# Patient Record
Sex: FEMALE | Race: WHITE | NOT HISPANIC OR LATINO
[De-identification: names, ages, dates, MRNs, and addresses within clinical notes are randomized per-mention and may not be internally consistent; named-entity substitution may affect disease eponyms.]

---

## 2017-01-04 ENCOUNTER — APPOINTMENT (OUTPATIENT)
Dept: SURGERY | Facility: CLINIC | Age: 72
End: 2017-01-04

## 2017-01-04 DIAGNOSIS — I10 ESSENTIAL (PRIMARY) HYPERTENSION: ICD-10-CM

## 2017-01-04 DIAGNOSIS — Z87.19 PERSONAL HISTORY OF OTHER DISEASES OF THE DIGESTIVE SYSTEM: ICD-10-CM

## 2017-02-10 ENCOUNTER — APPOINTMENT (OUTPATIENT)
Dept: PULMONOLOGY | Facility: CLINIC | Age: 72
End: 2017-02-10

## 2017-11-22 ENCOUNTER — APPOINTMENT (OUTPATIENT)
Dept: SURGERY | Facility: CLINIC | Age: 72
End: 2017-11-22

## 2018-08-01 ENCOUNTER — APPOINTMENT (OUTPATIENT)
Dept: SURGERY | Facility: CLINIC | Age: 73
End: 2018-08-01

## 2019-04-11 ENCOUNTER — INPATIENT (INPATIENT)
Facility: HOSPITAL | Age: 74
LOS: 4 days | Discharge: NURSING, REHAB OR LONG-TERM CARE | End: 2019-04-16
Attending: INTERNAL MEDICINE | Admitting: INTERNAL MEDICINE
Payer: MEDICARE

## 2019-04-11 VITALS
HEART RATE: 100 BPM | SYSTOLIC BLOOD PRESSURE: 171 MMHG | TEMPERATURE: 99 F | RESPIRATION RATE: 16 BRPM | DIASTOLIC BLOOD PRESSURE: 95 MMHG | OXYGEN SATURATION: 100 %

## 2019-04-11 DIAGNOSIS — J45.909 UNSPECIFIED ASTHMA, UNCOMPLICATED: ICD-10-CM

## 2019-04-11 DIAGNOSIS — M79.604 PAIN IN RIGHT LEG: ICD-10-CM

## 2019-04-11 DIAGNOSIS — M25.551 PAIN IN RIGHT HIP: ICD-10-CM

## 2019-04-11 DIAGNOSIS — I10 ESSENTIAL (PRIMARY) HYPERTENSION: ICD-10-CM

## 2019-04-11 DIAGNOSIS — R31.9 HEMATURIA, UNSPECIFIED: ICD-10-CM

## 2019-04-11 LAB
ALBUMIN SERPL ELPH-MCNC: 3.9 G/DL — SIGNIFICANT CHANGE UP (ref 3.3–5)
ALP SERPL-CCNC: 70 U/L — SIGNIFICANT CHANGE UP (ref 40–120)
ALT FLD-CCNC: 15 U/L — SIGNIFICANT CHANGE UP (ref 4–33)
ANION GAP SERPL CALC-SCNC: 14 MMO/L — SIGNIFICANT CHANGE UP (ref 7–14)
APPEARANCE UR: CLEAR — SIGNIFICANT CHANGE UP
AST SERPL-CCNC: 33 U/L — HIGH (ref 4–32)
BACTERIA # UR AUTO: NEGATIVE — SIGNIFICANT CHANGE UP
BASOPHILS # BLD AUTO: 0.05 K/UL — SIGNIFICANT CHANGE UP (ref 0–0.2)
BASOPHILS NFR BLD AUTO: 0.8 % — SIGNIFICANT CHANGE UP (ref 0–2)
BILIRUB SERPL-MCNC: 1 MG/DL — SIGNIFICANT CHANGE UP (ref 0.2–1.2)
BILIRUB UR-MCNC: NEGATIVE — SIGNIFICANT CHANGE UP
BLOOD UR QL VISUAL: NEGATIVE — SIGNIFICANT CHANGE UP
BUN SERPL-MCNC: 11 MG/DL — SIGNIFICANT CHANGE UP (ref 7–23)
CALCIUM SERPL-MCNC: 9.8 MG/DL — SIGNIFICANT CHANGE UP (ref 8.4–10.5)
CHLORIDE SERPL-SCNC: 103 MMOL/L — SIGNIFICANT CHANGE UP (ref 98–107)
CK SERPL-CCNC: 160 U/L — SIGNIFICANT CHANGE UP (ref 25–170)
CO2 SERPL-SCNC: 24 MMOL/L — SIGNIFICANT CHANGE UP (ref 22–31)
COLOR SPEC: YELLOW — SIGNIFICANT CHANGE UP
CREAT SERPL-MCNC: 0.72 MG/DL — SIGNIFICANT CHANGE UP (ref 0.5–1.3)
EOSINOPHIL # BLD AUTO: 0.08 K/UL — SIGNIFICANT CHANGE UP (ref 0–0.5)
EOSINOPHIL NFR BLD AUTO: 1.2 % — SIGNIFICANT CHANGE UP (ref 0–6)
GLUCOSE SERPL-MCNC: 114 MG/DL — HIGH (ref 70–99)
GLUCOSE UR-MCNC: NEGATIVE — SIGNIFICANT CHANGE UP
HCT VFR BLD CALC: 44.4 % — SIGNIFICANT CHANGE UP (ref 34.5–45)
HGB BLD-MCNC: 14.6 G/DL — SIGNIFICANT CHANGE UP (ref 11.5–15.5)
HYALINE CASTS # UR AUTO: NEGATIVE — SIGNIFICANT CHANGE UP
IMM GRANULOCYTES NFR BLD AUTO: 0.3 % — SIGNIFICANT CHANGE UP (ref 0–1.5)
KETONES UR-MCNC: NEGATIVE — SIGNIFICANT CHANGE UP
LEUKOCYTE ESTERASE UR-ACNC: SIGNIFICANT CHANGE UP
LYMPHOCYTES # BLD AUTO: 0.78 K/UL — LOW (ref 1–3.3)
LYMPHOCYTES # BLD AUTO: 12 % — LOW (ref 13–44)
MCHC RBC-ENTMCNC: 31.5 PG — SIGNIFICANT CHANGE UP (ref 27–34)
MCHC RBC-ENTMCNC: 32.9 % — SIGNIFICANT CHANGE UP (ref 32–36)
MCV RBC AUTO: 95.7 FL — SIGNIFICANT CHANGE UP (ref 80–100)
MONOCYTES # BLD AUTO: 0.41 K/UL — SIGNIFICANT CHANGE UP (ref 0–0.9)
MONOCYTES NFR BLD AUTO: 6.3 % — SIGNIFICANT CHANGE UP (ref 2–14)
NEUTROPHILS # BLD AUTO: 5.15 K/UL — SIGNIFICANT CHANGE UP (ref 1.8–7.4)
NEUTROPHILS NFR BLD AUTO: 79.4 % — HIGH (ref 43–77)
NITRITE UR-MCNC: NEGATIVE — SIGNIFICANT CHANGE UP
NRBC # FLD: 0 K/UL — SIGNIFICANT CHANGE UP (ref 0–0)
PH UR: 6.5 — SIGNIFICANT CHANGE UP (ref 5–8)
PLATELET # BLD AUTO: 158 K/UL — SIGNIFICANT CHANGE UP (ref 150–400)
PMV BLD: 9.5 FL — SIGNIFICANT CHANGE UP (ref 7–13)
POTASSIUM SERPL-MCNC: 4.7 MMOL/L — SIGNIFICANT CHANGE UP (ref 3.5–5.3)
POTASSIUM SERPL-SCNC: 4.7 MMOL/L — SIGNIFICANT CHANGE UP (ref 3.5–5.3)
PROT SERPL-MCNC: 7.5 G/DL — SIGNIFICANT CHANGE UP (ref 6–8.3)
PROT UR-MCNC: 10 — SIGNIFICANT CHANGE UP
RBC # BLD: 4.64 M/UL — SIGNIFICANT CHANGE UP (ref 3.8–5.2)
RBC # FLD: 13.1 % — SIGNIFICANT CHANGE UP (ref 10.3–14.5)
RBC CASTS # UR COMP ASSIST: SIGNIFICANT CHANGE UP (ref 0–?)
SODIUM SERPL-SCNC: 141 MMOL/L — SIGNIFICANT CHANGE UP (ref 135–145)
SP GR SPEC: 1.02 — SIGNIFICANT CHANGE UP (ref 1–1.04)
SQUAMOUS # UR AUTO: SIGNIFICANT CHANGE UP
UROBILINOGEN FLD QL: NORMAL — SIGNIFICANT CHANGE UP
WBC # BLD: 6.49 K/UL — SIGNIFICANT CHANGE UP (ref 3.8–10.5)
WBC # FLD AUTO: 6.49 K/UL — SIGNIFICANT CHANGE UP (ref 3.8–10.5)
WBC UR QL: HIGH (ref 0–?)

## 2019-04-11 PROCEDURE — 73502 X-RAY EXAM HIP UNI 2-3 VIEWS: CPT | Mod: 26,RT

## 2019-04-11 PROCEDURE — 74176 CT ABD & PELVIS W/O CONTRAST: CPT | Mod: 26,GC

## 2019-04-11 PROCEDURE — 93971 EXTREMITY STUDY: CPT | Mod: 26,LT

## 2019-04-11 PROCEDURE — 99223 1ST HOSP IP/OBS HIGH 75: CPT

## 2019-04-11 RX ORDER — IRBESARTAN 75 MG/1
1 TABLET ORAL
Qty: 0 | Refills: 0 | COMMUNITY

## 2019-04-11 RX ORDER — CYCLOBENZAPRINE HYDROCHLORIDE 10 MG/1
10 TABLET, FILM COATED ORAL THREE TIMES A DAY
Qty: 0 | Refills: 0 | Status: DISCONTINUED | OUTPATIENT
Start: 2019-04-11 | End: 2019-04-14

## 2019-04-11 RX ORDER — LOSARTAN POTASSIUM 100 MG/1
50 TABLET, FILM COATED ORAL DAILY
Qty: 0 | Refills: 0 | Status: DISCONTINUED | OUTPATIENT
Start: 2019-04-11 | End: 2019-04-16

## 2019-04-11 RX ORDER — FLUTICASONE PROPIONATE AND SALMETEROL 50; 250 UG/1; UG/1
1 POWDER ORAL; RESPIRATORY (INHALATION)
Qty: 0 | Refills: 0 | COMMUNITY

## 2019-04-11 RX ORDER — PANTOPRAZOLE SODIUM 20 MG/1
40 TABLET, DELAYED RELEASE ORAL
Qty: 0 | Refills: 0 | Status: DISCONTINUED | OUTPATIENT
Start: 2019-04-11 | End: 2019-04-16

## 2019-04-11 RX ORDER — OXYCODONE AND ACETAMINOPHEN 5; 325 MG/1; MG/1
1 TABLET ORAL ONCE
Qty: 0 | Refills: 0 | Status: DISCONTINUED | OUTPATIENT
Start: 2019-04-11 | End: 2019-04-11

## 2019-04-11 RX ORDER — CHOLECALCIFEROL (VITAMIN D3) 125 MCG
1000 CAPSULE ORAL DAILY
Qty: 0 | Refills: 0 | Status: DISCONTINUED | OUTPATIENT
Start: 2019-04-11 | End: 2019-04-16

## 2019-04-11 RX ORDER — SODIUM CHLORIDE 9 MG/ML
1000 INJECTION INTRAMUSCULAR; INTRAVENOUS; SUBCUTANEOUS
Qty: 0 | Refills: 0 | Status: DISCONTINUED | OUTPATIENT
Start: 2019-04-11 | End: 2019-04-13

## 2019-04-11 RX ORDER — CYCLOBENZAPRINE HYDROCHLORIDE 10 MG/1
10 TABLET, FILM COATED ORAL ONCE
Qty: 0 | Refills: 0 | Status: COMPLETED | OUTPATIENT
Start: 2019-04-11 | End: 2019-04-11

## 2019-04-11 RX ORDER — KETOROLAC TROMETHAMINE 30 MG/ML
15 SYRINGE (ML) INJECTION EVERY 6 HOURS
Qty: 0 | Refills: 0 | Status: DISCONTINUED | OUTPATIENT
Start: 2019-04-11 | End: 2019-04-12

## 2019-04-11 RX ORDER — DILTIAZEM HCL 120 MG
1 CAPSULE, EXT RELEASE 24 HR ORAL
Qty: 0 | Refills: 0 | COMMUNITY

## 2019-04-11 RX ORDER — ZOLPIDEM TARTRATE 10 MG/1
5 TABLET ORAL AT BEDTIME
Qty: 0 | Refills: 0 | Status: DISCONTINUED | OUTPATIENT
Start: 2019-04-11 | End: 2019-04-15

## 2019-04-11 RX ORDER — DEXAMETHASONE 0.5 MG/5ML
6 ELIXIR ORAL ONCE
Qty: 0 | Refills: 0 | Status: COMPLETED | OUTPATIENT
Start: 2019-04-11 | End: 2019-04-11

## 2019-04-11 RX ORDER — TRIAMTERENE/HYDROCHLOROTHIAZID 75 MG-50MG
1 TABLET ORAL
Qty: 0 | Refills: 0 | COMMUNITY

## 2019-04-11 RX ORDER — CHOLECALCIFEROL (VITAMIN D3) 125 MCG
1 CAPSULE ORAL
Qty: 0 | Refills: 0 | COMMUNITY

## 2019-04-11 RX ORDER — KETOROLAC TROMETHAMINE 30 MG/ML
15 SYRINGE (ML) INJECTION EVERY 6 HOURS
Qty: 0 | Refills: 0 | Status: DISCONTINUED | OUTPATIENT
Start: 2019-04-11 | End: 2019-04-11

## 2019-04-11 RX ORDER — ENOXAPARIN SODIUM 100 MG/ML
40 INJECTION SUBCUTANEOUS EVERY 24 HOURS
Qty: 0 | Refills: 0 | Status: DISCONTINUED | OUTPATIENT
Start: 2019-04-11 | End: 2019-04-16

## 2019-04-11 RX ORDER — ANASTROZOLE 1 MG/1
1 TABLET ORAL AT BEDTIME
Qty: 0 | Refills: 0 | Status: DISCONTINUED | OUTPATIENT
Start: 2019-04-12 | End: 2019-04-16

## 2019-04-11 RX ORDER — MORPHINE SULFATE 50 MG/1
2 CAPSULE, EXTENDED RELEASE ORAL ONCE
Qty: 0 | Refills: 0 | Status: DISCONTINUED | OUTPATIENT
Start: 2019-04-11 | End: 2019-04-11

## 2019-04-11 RX ORDER — BUDESONIDE AND FORMOTEROL FUMARATE DIHYDRATE 160; 4.5 UG/1; UG/1
2 AEROSOL RESPIRATORY (INHALATION)
Qty: 0 | Refills: 0 | Status: DISCONTINUED | OUTPATIENT
Start: 2019-04-11 | End: 2019-04-16

## 2019-04-11 RX ORDER — DILTIAZEM HCL 120 MG
180 CAPSULE, EXT RELEASE 24 HR ORAL DAILY
Qty: 0 | Refills: 0 | Status: DISCONTINUED | OUTPATIENT
Start: 2019-04-11 | End: 2019-04-16

## 2019-04-11 RX ORDER — ACETAMINOPHEN 500 MG
650 TABLET ORAL ONCE
Qty: 0 | Refills: 0 | Status: COMPLETED | OUTPATIENT
Start: 2019-04-11 | End: 2019-04-11

## 2019-04-11 RX ORDER — ANASTROZOLE 1 MG/1
1 TABLET ORAL ONCE
Qty: 0 | Refills: 0 | Status: COMPLETED | OUTPATIENT
Start: 2019-04-11 | End: 2019-04-11

## 2019-04-11 RX ORDER — ANASTROZOLE 1 MG/1
1 TABLET ORAL
Qty: 0 | Refills: 0 | COMMUNITY

## 2019-04-11 RX ORDER — KETOROLAC TROMETHAMINE 30 MG/ML
15 SYRINGE (ML) INJECTION ONCE
Qty: 0 | Refills: 0 | Status: DISCONTINUED | OUTPATIENT
Start: 2019-04-11 | End: 2019-04-11

## 2019-04-11 RX ORDER — HYDROMORPHONE HYDROCHLORIDE 2 MG/ML
0.5 INJECTION INTRAMUSCULAR; INTRAVENOUS; SUBCUTANEOUS ONCE
Qty: 0 | Refills: 0 | Status: DISCONTINUED | OUTPATIENT
Start: 2019-04-11 | End: 2019-04-11

## 2019-04-11 RX ORDER — TRIAMTERENE/HYDROCHLOROTHIAZID 75 MG-50MG
1 TABLET ORAL
Qty: 0 | Refills: 0 | Status: DISCONTINUED | OUTPATIENT
Start: 2019-04-11 | End: 2019-04-16

## 2019-04-11 RX ADMIN — Medication 650 MILLIGRAM(S): at 15:19

## 2019-04-11 RX ADMIN — ANASTROZOLE 1 MILLIGRAM(S): 1 TABLET ORAL at 23:38

## 2019-04-11 RX ADMIN — OXYCODONE AND ACETAMINOPHEN 1 TABLET(S): 5; 325 TABLET ORAL at 16:30

## 2019-04-11 RX ADMIN — HYDROMORPHONE HYDROCHLORIDE 0.5 MILLIGRAM(S): 2 INJECTION INTRAMUSCULAR; INTRAVENOUS; SUBCUTANEOUS at 17:02

## 2019-04-11 RX ADMIN — Medication 650 MILLIGRAM(S): at 16:30

## 2019-04-11 RX ADMIN — MORPHINE SULFATE 2 MILLIGRAM(S): 50 CAPSULE, EXTENDED RELEASE ORAL at 10:01

## 2019-04-11 RX ADMIN — Medication 6 MILLIGRAM(S): at 17:02

## 2019-04-11 RX ADMIN — HYDROMORPHONE HYDROCHLORIDE 0.5 MILLIGRAM(S): 2 INJECTION INTRAMUSCULAR; INTRAVENOUS; SUBCUTANEOUS at 19:56

## 2019-04-11 RX ADMIN — CYCLOBENZAPRINE HYDROCHLORIDE 10 MILLIGRAM(S): 10 TABLET, FILM COATED ORAL at 15:19

## 2019-04-11 RX ADMIN — LOSARTAN POTASSIUM 50 MILLIGRAM(S): 100 TABLET, FILM COATED ORAL at 19:56

## 2019-04-11 RX ADMIN — Medication 15 MILLIGRAM(S): at 23:39

## 2019-04-11 RX ADMIN — SODIUM CHLORIDE 100 MILLILITER(S): 9 INJECTION INTRAMUSCULAR; INTRAVENOUS; SUBCUTANEOUS at 23:49

## 2019-04-11 RX ADMIN — Medication 15 MILLIGRAM(S): at 16:30

## 2019-04-11 RX ADMIN — MORPHINE SULFATE 2 MILLIGRAM(S): 50 CAPSULE, EXTENDED RELEASE ORAL at 15:13

## 2019-04-11 RX ADMIN — OXYCODONE AND ACETAMINOPHEN 1 TABLET(S): 5; 325 TABLET ORAL at 15:19

## 2019-04-11 RX ADMIN — Medication 15 MILLIGRAM(S): at 15:19

## 2019-04-11 NOTE — ED ADULT NURSE REASSESSMENT NOTE - NS ED NURSE REASSESS COMMENT FT1
covering primary RN for break pt is in bed  A and OX  3 in NAD pt c/o lower back pain and right leg pain. orders noted and completed, comfort measures provided.

## 2019-04-11 NOTE — ED ADULT NURSE NOTE - OBJECTIVE STATEMENT
Patient received to room 6, A&Ox4, respirations even and unlabored, skin warm and dry good for color, skin intact, right foot +1 pitting edema observed, +pedal pulses, + capillary refill, ambulatory with cane at baseline, currently moves all extremities, patient reports unable to walk x 2 weeks due to lower back and right leg pain. Patient arrives with complaints of lower back pain radiating towards right leg, hematuria. Denies chest pain, SOB, LOC. Hx right breast CA, HTN, Asthma. Left Ac 20g IC placed, labs drawn and sent.

## 2019-04-11 NOTE — ED PROVIDER NOTE - CARE PLAN
Principal Discharge DX:	Pain in right leg  Secondary Diagnosis:	Hematuria Principal Discharge DX:	Pain in right leg  Secondary Diagnosis:	Hematuria  Secondary Diagnosis:	Hip pain  Secondary Diagnosis:	Inability to ambulate due to hip

## 2019-04-11 NOTE — H&P ADULT - HISTORY OF PRESENT ILLNESS
72 yo f h/o right breast ca s/p partial mastectomy on anastrozole 74 yo f h/o right breast ca s/p partial mastectomy on anastrozole, htn, well-controlled asthma. Pt reports progressively worsening right hip/anteri 72 yo f h/o right breast ca s/p partial mastectomy on anastrozole, htn, well-controlled asthma. Pt reports progressively worsening right hip/anterior thigh pain over last weeks that began after one severe cough. Denies direct trauma, fall; denies numbness, focal weakness, fevers. Reports chronic unchanged urinary incontinence. Does note one episode of mild hematuria this morning, mild right flank pain on palpation, denies dysuria.  UA pending. Plain hip film negative for fracture or dislocation. Ct renal stone hunt negative for stones or hydro, + mild left lateral subluxation of L4-5.  Pt  since December, lives alone; children  live within an hour away, cannot ambulate due to pain. 74 yo f h/o right breast ca s/p partial mastectomy on anastrozole, htn, well-controlled asthma. Pt reports progressively worsening right hip/anterior thigh pain over last weeks that began after one severe cough. Denies direct trauma, fall; denies numbness, focal weakness, fevers. Reports chronic unchanged urinary incontinence. Does note one episode of mild hematuria this morning, mild right flank pain on palpation, denies dysuria.  UA pending. Plain hip film negative for fracture or dislocation. Ct renal stone hunt negative for stones or hydro, + mild left lateral subluxation of L4-5.  Pt  since December, lives alone; children  live within an hour away, cannot ambulate due to pain.  Pt reports taking 2 azithromycin 3 days ago for cough, but stopped when developed diarrhea. Pt with no cough during my exam, lungs CTA

## 2019-04-11 NOTE — ED ADULT NURSE REASSESSMENT NOTE - NS ED NURSE REASSESS COMMENT FT1
[facility] MAR[facility] Langford.

## 2019-04-11 NOTE — ED PROVIDER NOTE - CLINICAL SUMMARY MEDICAL DECISION MAKING FREE TEXT BOX
72 Y/o F notes 1 day of hematuria an 5 days of leg and thigh pain worse over the past 1-2 days. No upper lumbar/thoracic back pain. Will US to R/O R dvt due to swelling and pain. Will non-con due to hematuria and flank pain, no CVA tenderness on exam. Pt is well appearing. Labs and UA ordered.

## 2019-04-11 NOTE — ED ADULT NURSE NOTE - NSIMPLEMENTINTERV_GEN_ALL_ED
Implemented All Fall Risk Interventions:  [place] to call system. Call bell, personal items and telephone within reach. Instruct patient to call for assistance. Room bathroom lighting operational. Non-slip footwear when patient is off stretcher. Physically safe environment: no spills, clutter or unnecessary equipment. Stretcher in lowest position, wheels locked, appropriate side rails in place. Provide visual cue, wrist band, yellow gown, etc. Monitor gait and stability. Monitor for mental status changes and reorient to person, place, and time. Review medications for side effects contributing to fall risk. Reinforce activity limits and safety measures with patient and family.

## 2019-04-11 NOTE — H&P ADULT - NSHPREVIEWOFSYSTEMS_GEN_ALL_CORE
Review of Systems:   CONSTITUTIONAL: No fever, weight loss  EYES: No eye pain, visual disturbances, or discharge  ENMT:  No difficulty hearing, tinnitus, vertigo; No sinus or throat pain  NECK: No pain or stiffness  RESPIRATORY: No cough, wheezing, chills or hemoptysis; No shortness of breath  CARDIOVASCULAR: No chest pain, palpitations, dizziness, or leg swelling  GASTROINTESTINAL: No abdominal or epigastric pain. No nausea, vomiting, or hematemesis; No diarrhea or constipation. No melena or hematochezia.  GENITOURINARY: No dysuria, frequency, hematuria, or incontinence  NEUROLOGICAL: No headaches, memory loss, loss of strength, numbness, or tremors  SKIN: No itching, burning, rashes, or lesions   MUSCULOSKELETAL:  right hip/anterior thigh pain Review of Systems:   CONSTITUTIONAL: No fever, weight loss  EYES: No eye pain, visual disturbances, or discharge  ENMT:  No difficulty hearing, tinnitus, vertigo; No sinus or throat pain  NECK: No pain or stiffness  RESPIRATORY: No cough, wheezing, chills or hemoptysis; No shortness of breath  CARDIOVASCULAR: No chest pain, palpitations, dizziness, or leg swelling  GASTROINTESTINAL: No abdominal or epigastric pain. No nausea, vomiting, or hematemesis;  GENITOURINARY: No dysuria, frequency; +hematuria x1, chronic incontinence  NEUROLOGICAL: No headaches, memory loss, loss of strength, numbness, or tremors  SKIN: No itching, burning, rashes, or lesions   MUSCULOSKELETAL:  right hip/anterior thigh pain

## 2019-04-11 NOTE — H&P ADULT - NSICDXPASTMEDICALHX_GEN_ALL_CORE_FT
PAST MEDICAL HISTORY:  Acid reflux     Asthma controlled    Hypertension     Obese     Osteoarthritis bilateral knee    Seasonal allergies

## 2019-04-11 NOTE — ED PROVIDER NOTE - OBJECTIVE STATEMENT
74 Y/O F PMH Arthritis HTN Minimal breast CA C/O 1 week of R leg and flank pain. Pt states her R leg is always somewhat swollen. He 72 Y/O F PMH Arthritis HTN Minimal breast CA C/O 1 week of R leg and flank pain. Pt states her R leg is always somewhat swollen. She states she had a normal US one year ago in the leg. States the pain begins in her leg and goes up towards the back. She also C/O 1 day of hematuria. She states the pain is 9/10 and sharp. She had one episode of diarrhea yesterday, states she recently finished  Denies CP SOB ABD PN N V D Dizz or any other acute symptoms.

## 2019-04-11 NOTE — H&P ADULT - PROBLEM SELECTOR PLAN 1
2/2 hip OA? referred pain from lumbar spine?  will place on standing Toradol for next 24 hours, as well as prn flexiril  would obtain PM&R eval  Pt ordered

## 2019-04-11 NOTE — ED PROVIDER NOTE - ATTENDING CONTRIBUTION TO CARE
The patient seen and examined.  The patient presents with back pain and right leg pain  Most likely musculoskeletal  Sciatica    I, Romel Mcmillan, performed the initial face to face bedside interview with this patient regarding history of present illness, review of symptoms and relevant past medical, social and family history.  I completed an independent physical examination.  I was the initial provider who evaluated this patient. I have signed out the follow up of any pending tests (i.e. labs, radiological studies) to the ACP.  I have communicated the patient’s plan of care and disposition with the ACP.

## 2019-04-11 NOTE — ED PROVIDER NOTE - PHYSICAL EXAMINATION
R leg swelling, mild R calf tenderness, palpable dorsal pulse cap refill <2 seconds. Mild pain with movement of hip however pain localizes to leg. No flank tenderness.

## 2019-04-11 NOTE — ED PROVIDER NOTE - PROGRESS NOTE DETAILS
NIHARIKA Davis: Pt still unable to ambulate in spite of multiple rounds of pain medication in ER. Admit for PT eval, inability to ambulate, unsafe discharge.

## 2019-04-11 NOTE — H&P ADULT - NSHPLABSRESULTS_GEN_ALL_CORE
14.6   6.49  )-----------( 158      ( 2019 09:40 )             44.4     04-11    141  |  103  |  11  ----------------------------<  114<H>  4.7   |  24  |  0.72    Ca    9.8      2019 09:40    TPro  7.5  /  Alb  3.9  /  TBili  1.0  /  DBili  x   /  AST  33<H>  /  ALT  15  /  AlkPhos  70  04-11    CAPILLARY BLOOD GLUCOSE          Urinalysis Basic - ( 2019 22:10 )    Color: YELLOW / Appearance: CLEAR / S.017 / pH: 6.5  Gluc: NEGATIVE / Ketone: NEGATIVE  / Bili: NEGATIVE / Urobili: NORMAL   Blood: NEGATIVE / Protein: 10 / Nitrite: NEGATIVE   Leuk Esterase: MODERATE / RBC: 0-2 / WBC 6-10   Sq Epi: MODERATE / Non Sq Epi: x / Bacteria: NEGATIVE      Vital Signs Last 24 Hrs  T(C): 36.7 (2019 22:15), Max: 37.2 (2019 08:54)  T(F): 98 (2019 22:15), Max: 98.9 (2019 08:54)  HR: 67 (2019 22:15) (67 - 100)  BP: 159/90 (2019 22:15) (140/70 - 182/99)  BP(mean): --  RR: 18 (2019 22:15) (16 - 18)  SpO2: 100% (2019 22:15) (94% - 100%)

## 2019-04-11 NOTE — ED PROVIDER NOTE - PMH
Acid reflux    Asthma  controlled  Hypertension    Obese    Osteoarthritis  bilateral knee  Seasonal allergies

## 2019-04-11 NOTE — ED ADULT TRIAGE NOTE - CHIEF COMPLAINT QUOTE
pt c/o right hip and right thigh pain x 1 week. denies fall/injury. pain unrelieved with tylenol. pt also c/o 1 episode of hematuria this morning. denies dysuria/fever/chills.

## 2019-04-11 NOTE — H&P ADULT - NSHPPHYSICALEXAM_GEN_ALL_CORE
PHYSICAL EXAM:      Constitutional: NAD, well-groomed, well-developed  HEENT: EOMI, Normal Hearing  Neck: No LAD, No JVD  Back: Normal spine flexure, No CVA tenderness  Respiratory: CTAB  Cardiovascular: S1 and S2, RRR, no M/G/R  Gastrointestinal: BS+, soft, NT/ND, mild right CVA tenderness with palpation   Extremities: No peripheral edema  Vascular: 2+ peripheral pulses  Neurological: A/O x 3  Psychiatric: Normal mood, normal affect  Musculoskeletal: 5/5 strength b/l upper; right  lower extremity limited by pain  Skin: No rashes

## 2019-04-12 LAB
APTT BLD: 29.9 SEC — SIGNIFICANT CHANGE UP (ref 27.5–36.3)
INR BLD: 1.15 — SIGNIFICANT CHANGE UP (ref 0.88–1.17)
PROTHROM AB SERPL-ACNC: 12.8 SEC — SIGNIFICANT CHANGE UP (ref 9.8–13.1)

## 2019-04-12 PROCEDURE — 99233 SBSQ HOSP IP/OBS HIGH 50: CPT

## 2019-04-12 PROCEDURE — 99222 1ST HOSP IP/OBS MODERATE 55: CPT | Mod: GC

## 2019-04-12 RX ORDER — DOCUSATE SODIUM 100 MG
100 CAPSULE ORAL THREE TIMES A DAY
Qty: 0 | Refills: 0 | Status: DISCONTINUED | OUTPATIENT
Start: 2019-04-12 | End: 2019-04-16

## 2019-04-12 RX ORDER — SIMETHICONE 80 MG/1
80 TABLET, CHEWABLE ORAL THREE TIMES A DAY
Qty: 0 | Refills: 0 | Status: DISCONTINUED | OUTPATIENT
Start: 2019-04-12 | End: 2019-04-16

## 2019-04-12 RX ORDER — SENNA PLUS 8.6 MG/1
2 TABLET ORAL AT BEDTIME
Qty: 0 | Refills: 0 | Status: DISCONTINUED | OUTPATIENT
Start: 2019-04-12 | End: 2019-04-16

## 2019-04-12 RX ORDER — KETOROLAC TROMETHAMINE 30 MG/ML
15 SYRINGE (ML) INJECTION EVERY 6 HOURS
Qty: 0 | Refills: 0 | Status: DISCONTINUED | OUTPATIENT
Start: 2019-04-12 | End: 2019-04-14

## 2019-04-12 RX ORDER — OXYCODONE HYDROCHLORIDE 5 MG/1
5 TABLET ORAL EVERY 4 HOURS
Qty: 0 | Refills: 0 | Status: DISCONTINUED | OUTPATIENT
Start: 2019-04-12 | End: 2019-04-14

## 2019-04-12 RX ORDER — POLYETHYLENE GLYCOL 3350 17 G/17G
17 POWDER, FOR SOLUTION ORAL DAILY
Qty: 0 | Refills: 0 | Status: DISCONTINUED | OUTPATIENT
Start: 2019-04-12 | End: 2019-04-16

## 2019-04-12 RX ORDER — HYDROMORPHONE HYDROCHLORIDE 2 MG/ML
0.5 INJECTION INTRAMUSCULAR; INTRAVENOUS; SUBCUTANEOUS EVERY 4 HOURS
Qty: 0 | Refills: 0 | Status: DISCONTINUED | OUTPATIENT
Start: 2019-04-12 | End: 2019-04-16

## 2019-04-12 RX ADMIN — LOSARTAN POTASSIUM 50 MILLIGRAM(S): 100 TABLET, FILM COATED ORAL at 06:20

## 2019-04-12 RX ADMIN — Medication 100 MILLIGRAM(S): at 12:25

## 2019-04-12 RX ADMIN — BUDESONIDE AND FORMOTEROL FUMARATE DIHYDRATE 2 PUFF(S): 160; 4.5 AEROSOL RESPIRATORY (INHALATION) at 22:14

## 2019-04-12 RX ADMIN — Medication 15 MILLIGRAM(S): at 12:25

## 2019-04-12 RX ADMIN — PANTOPRAZOLE SODIUM 40 MILLIGRAM(S): 20 TABLET, DELAYED RELEASE ORAL at 06:20

## 2019-04-12 RX ADMIN — SENNA PLUS 2 TABLET(S): 8.6 TABLET ORAL at 21:14

## 2019-04-12 RX ADMIN — SODIUM CHLORIDE 100 MILLILITER(S): 9 INJECTION INTRAMUSCULAR; INTRAVENOUS; SUBCUTANEOUS at 10:04

## 2019-04-12 RX ADMIN — SIMETHICONE 80 MILLIGRAM(S): 80 TABLET, CHEWABLE ORAL at 19:58

## 2019-04-12 RX ADMIN — Medication 15 MILLIGRAM(S): at 07:19

## 2019-04-12 RX ADMIN — ENOXAPARIN SODIUM 40 MILLIGRAM(S): 100 INJECTION SUBCUTANEOUS at 10:02

## 2019-04-12 RX ADMIN — Medication 15 MILLIGRAM(S): at 12:40

## 2019-04-12 RX ADMIN — Medication 1000 UNIT(S): at 12:25

## 2019-04-12 RX ADMIN — Medication 15 MILLIGRAM(S): at 18:27

## 2019-04-12 RX ADMIN — SIMETHICONE 80 MILLIGRAM(S): 80 TABLET, CHEWABLE ORAL at 10:01

## 2019-04-12 RX ADMIN — Medication 1 CAPSULE(S): at 10:04

## 2019-04-12 RX ADMIN — Medication 180 MILLIGRAM(S): at 06:21

## 2019-04-12 RX ADMIN — Medication 15 MILLIGRAM(S): at 18:42

## 2019-04-12 RX ADMIN — Medication 100 MILLIGRAM(S): at 10:01

## 2019-04-12 RX ADMIN — Medication 100 MILLIGRAM(S): at 21:14

## 2019-04-12 RX ADMIN — BUDESONIDE AND FORMOTEROL FUMARATE DIHYDRATE 2 PUFF(S): 160; 4.5 AEROSOL RESPIRATORY (INHALATION) at 10:01

## 2019-04-12 RX ADMIN — ANASTROZOLE 1 MILLIGRAM(S): 1 TABLET ORAL at 21:20

## 2019-04-12 RX ADMIN — Medication 15 MILLIGRAM(S): at 07:18

## 2019-04-12 RX ADMIN — Medication 15 MILLIGRAM(S): at 06:19

## 2019-04-12 NOTE — PHYSICAL THERAPY INITIAL EVALUATION ADULT - PERTINENT HX OF CURRENT PROBLEM, REHAB EVAL
Pt. is a 73 year old female admitted to [facility] due to pain in right LE. PMH: obese, HTN, Asthma.

## 2019-04-12 NOTE — CONSULT NOTE ADULT - SUBJECTIVE AND OBJECTIVE BOX
HPI:  72 yo f h/o right breast ca s/p partial mastectomy on anastrozole, htn, well-controlled asthma. Pt reports progressively worsening right hip/anterior thigh pain over last weeks that began after one severe cough. Denies direct trauma, fall; denies numbness, focal weakness, fevers. Reports chronic unchanged urinary incontinence. Does note one episode of mild hematuria this morning, mild right flank pain on palpation, denies dysuria.  UA pending. Plain hip film negative for fracture or dislocation. Ct renal stone hunt negative for stones or hydro, + mild left lateral subluxation of L4-5.  Pt  since December, lives alone; children  live within an hour away, cannot ambulate due to pain.  Pt reports taking 2 azithromycin 3 days ago for cough, but stopped when developed diarrhea. Pt with no cough during my exam, lungs CTA (2019 20:14)   CT pelvis showed LS DJD, facet arththosis and b/l hip arthritis.     REVIEW OF SYSTEMS: No chest pain, shortness of breath, nausea, vomiting or diarhea.      PAST MEDICAL & SURGICAL HISTORY  Asthma  Seasonal allergies  Osteoarthritis  Obese  Acid reflux  Hypertension  History of total knee replacement      SOCIAL HISTORY  Smoking - Denied, EtOH - Denied, Drugs - Denied    FUNCTIONAL HISTORY:   Lives alone  Independent pTA with [facility]     CURRENT FUNCTIONAL STATUS: min a         FAMILY HISTORY   No pertinent family history in first degree relatives      RECENT LABS/IMAGING  CBC Full  -  ( 2019 09:40 )  WBC Count : 6.49 K/uL  RBC Count : 4.64 M/uL  Hemoglobin : 14.6 g/dL  Hematocrit : 44.4 %  Platelet Count - Automated : 158 K/uL  Mean Cell Volume : 95.7 fL  Mean Cell Hemoglobin : 31.5 pg  Mean Cell Hemoglobin Concentration : 32.9 %  Auto Neutrophil # : 5.15 K/uL  Auto Lymphocyte # : 0.78 K/uL  Auto Monocyte # : 0.41 K/uL  Auto Eosinophil # : 0.08 K/uL  Auto Basophil # : 0.05 K/uL  Auto Neutrophil % : 79.4 %  Auto Lymphocyte % : 12.0 %  Auto Monocyte % : 6.3 %  Auto Eosinophil % : 1.2 %  Auto Basophil % : 0.8 %        141  |  103  |  11  ----------------------------<  114<H>  4.7   |  24  |  0.72    Ca    9.8      2019 09:40    TPro  7.5  /  Alb  3.9  /  TBili  1.0  /  DBili  x   /  AST  33<H>  /  ALT  15  /  AlkPhos  70  04-11    Urinalysis Basic - ( 2019 22:10 )    Color: YELLOW / Appearance: CLEAR / S.017 / pH: 6.5  Gluc: NEGATIVE / Ketone: NEGATIVE  / Bili: NEGATIVE / Urobili: NORMAL   Blood: NEGATIVE / Protein: 10 / Nitrite: NEGATIVE   Leuk Esterase: MODERATE / RBC: 0-2 / WBC 6-10   Sq Epi: MODERATE / Non Sq Epi: x / Bacteria: NEGATIVE        VITALS  T(C): 36.7 (19 @ 06:18), Max: 36.9 (19 @ 02:00)  HR: 80 (19 @ 06:18) (59 - 94)  BP: 154/90 (19 @ 06:18) (140/70 - 159/90)  RR: 16 (19 @ 06:18) (16 - 18)  SpO2: 97% (19 @ 06:18) (94% - 100%)  Wt(kg): --    ALLERGIES  No Known Allergies      MEDICATIONS   anastrozole 1 milliGRAM(s) Oral at bedtime  bisacodyl Suppository 10 milliGRAM(s) Rectal daily PRN  buDESOnide 160 MICROgram(s)/formoterol 4.5 MICROgram(s) Inhaler 2 Puff(s) Inhalation two times a day  cholecalciferol 1000 Unit(s) Oral daily  cyclobenzaprine 10 milliGRAM(s) Oral three times a day PRN  diltiazem    milliGRAM(s) Oral daily  docusate sodium 100 milliGRAM(s) Oral three times a day  enoxaparin Injectable 40 milliGRAM(s) SubCutaneous every 24 hours  losartan 50 milliGRAM(s) Oral daily  pantoprazole    Tablet 40 milliGRAM(s) Oral before breakfast  polyethylene glycol 3350 17 Gram(s) Oral daily  senna 2 Tablet(s) Oral at bedtime  simethicone 80 milliGRAM(s) Chew three times a day PRN  sodium chloride 0.9%. 1000 milliLiter(s) IV Continuous <Continuous>  triamterene 37.5 mG/hydrochlorothiazide 25 mG Capsule 1 Capsule(s) Oral <User Schedule>  zolpidem 5 milliGRAM(s) Oral at bedtime PRN      ----------------------------------------------------------------------------------------  PHYSICAL EXAM  Constitutional - NAD, Comfortable  HEENT - NCAT, EOMI  Neck - Supple, No limited ROM  Chest - CTA bilaterally, No wheeze, No rhonchi, No crackles  Cardiovascular - RRR, S1S2, No murmurs  Abdomen - BS+, Soft, NTND  Extremities - No C/C/E, No calf tenderness   Neurologic Exam -                    Cognitive - Awake, Alert, AAO to self, place, date, year, situation     Communication - Fluent, No dysarthria, no aphasia     Cranial Nerves - CN 2-12 intact     Motor - No focal deficits                       Sensory - Intact to LT     Reflexes - DTR Intact, No primitive reflexive     Balance - WNL Static  Psychiatric - Mood stable, Affect WNL HPI:  72 yo f h/o right breast ca s/p partial mastectomy on anastrozole, htn, well-controlled asthma. Pt reports progressively worsening right hip/anterior thigh pain over last weeks that began after one severe cough. Denies direct trauma, fall; denies numbness, focal weakness, fevers. Reports chronic unchanged urinary incontinence. Does note one episode of mild hematuria this morning, mild right flank pain on palpation, denies dysuria.  UA pending. Plain hip film negative for fracture or dislocation. Ct renal stone hunt negative for stones or hydro, + mild left lateral subluxation of L4-5.  Pt  since December, lives alone; children  live within an hour away, cannot ambulate due to pain.  Pt reports taking 2 azithromycin 3 days ago for cough, but stopped when developed diarrhea. Pt with no cough during my exam, lungs CTA (2019 20:14)   CT pelvis showed LS DJD, facet arththosis and b/l hip arthritis.   pain is in the right mid thigh, worse with weight bearing, better in bed.   No pain with rotation of hip. No groin pain, no numbness/tingling/weakness.     REVIEW OF SYSTEMS: No chest pain, shortness of breath, nausea, vomiting or diarhea.      PAST MEDICAL & SURGICAL HISTORY  Asthma  Seasonal allergies  Osteoarthritis  Obese  Acid reflux  Hypertension  History of total knee replacement      SOCIAL HISTORY  Smoking - Denied, EtOH - Denied, Drugs - Denied    FUNCTIONAL HISTORY:   Lives alone  Independent pTA with [facility]     CURRENT FUNCTIONAL STATUS: min a         FAMILY HISTORY   No pertinent family history in first degree relatives      RECENT LABS/IMAGING  CBC Full  -  ( 2019 09:40 )  WBC Count : 6.49 K/uL  RBC Count : 4.64 M/uL  Hemoglobin : 14.6 g/dL  Hematocrit : 44.4 %  Platelet Count - Automated : 158 K/uL  Mean Cell Volume : 95.7 fL  Mean Cell Hemoglobin : 31.5 pg  Mean Cell Hemoglobin Concentration : 32.9 %  Auto Neutrophil # : 5.15 K/uL  Auto Lymphocyte # : 0.78 K/uL  Auto Monocyte # : 0.41 K/uL  Auto Eosinophil # : 0.08 K/uL  Auto Basophil # : 0.05 K/uL  Auto Neutrophil % : 79.4 %  Auto Lymphocyte % : 12.0 %  Auto Monocyte % : 6.3 %  Auto Eosinophil % : 1.2 %  Auto Basophil % : 0.8 %        141  |  103  |  11  ----------------------------<  114<H>  4.7   |  24  |  0.72    Ca    9.8      2019 09:40    TPro  7.5  /  Alb  3.9  /  TBili  1.0  /  DBili  x   /  AST  33<H>  /  ALT  15  /  AlkPhos  70  04-11    Urinalysis Basic - ( 2019 22:10 )    Color: YELLOW / Appearance: CLEAR / S.017 / pH: 6.5  Gluc: NEGATIVE / Ketone: NEGATIVE  / Bili: NEGATIVE / Urobili: NORMAL   Blood: NEGATIVE / Protein: 10 / Nitrite: NEGATIVE   Leuk Esterase: MODERATE / RBC: 0-2 / WBC 6-10   Sq Epi: MODERATE / Non Sq Epi: x / Bacteria: NEGATIVE        VITALS  T(C): 36.7 (19 @ 06:18), Max: 36.9 (19 @ 02:00)  HR: 80 (19 @ 06:18) (59 - 94)  BP: 154/90 (19 @ 06:18) (140/70 - 159/90)  RR: 16 (19 @ 06:18) (16 - 18)  SpO2: 97% (19 @ 06:18) (94% - 100%)  Wt(kg): --    ALLERGIES  No Known Allergies      MEDICATIONS   anastrozole 1 milliGRAM(s) Oral at bedtime  bisacodyl Suppository 10 milliGRAM(s) Rectal daily PRN  buDESOnide 160 MICROgram(s)/formoterol 4.5 MICROgram(s) Inhaler 2 Puff(s) Inhalation two times a day  cholecalciferol 1000 Unit(s) Oral daily  cyclobenzaprine 10 milliGRAM(s) Oral three times a day PRN  diltiazem    milliGRAM(s) Oral daily  docusate sodium 100 milliGRAM(s) Oral three times a day  enoxaparin Injectable 40 milliGRAM(s) SubCutaneous every 24 hours  losartan 50 milliGRAM(s) Oral daily  pantoprazole    Tablet 40 milliGRAM(s) Oral before breakfast  polyethylene glycol 3350 17 Gram(s) Oral daily  senna 2 Tablet(s) Oral at bedtime  simethicone 80 milliGRAM(s) Chew three times a day PRN  sodium chloride 0.9%. 1000 milliLiter(s) IV Continuous <Continuous>  triamterene 37.5 mG/hydrochlorothiazide 25 mG Capsule 1 Capsule(s) Oral <User Schedule>  zolpidem 5 milliGRAM(s) Oral at bedtime PRN      ----------------------------------------------------------------------------------------  PHYSICAL EXAM  Constitutional - NAD, Comfortable  HEENT - NCAT, EOMI  Neck - Supple, No limited ROM  Chest - CTA bilaterally, No wheeze, No rhonchi, No crackles  Cardiovascular - RRR, S1S2, No murmurs  Abdomen - BS+, Soft, NTND  Extremities - No C/C/E, No calf tenderness + pain medial mid thigh on palpation  MSK: NROM b/l hips with no pain reproduced   Neurologic Exam -                    Cognitive - Awake, Alert, AAO to self, place, date, year, situation     Communication - Fluent, No dysarthria, no aphasia     Cranial Nerves - CN 2-12 intact     Motor - No focal deficits but RLE limited due to pain

## 2019-04-12 NOTE — PHYSICAL THERAPY INITIAL EVALUATION ADULT - MANUAL MUSCLE TESTING RESULTS, REHAB EVAL
Bilateral UE muscle strength grossly 3/5 Throughout; Bilateral LE muscle strength grossly 3/5 Throughout however, bilateral hip strength grossly 3-/5 throughout .

## 2019-04-12 NOTE — PHYSICAL THERAPY INITIAL EVALUATION ADULT - LEVEL OF INDEPENDENCE: GAIT, REHAB EVAL
in stance pt. reporting pain and requesting to return to bed. Pt. deferred ambulation assessment. MARISSA QUIROGA made aware/unable to perform

## 2019-04-12 NOTE — PROGRESS NOTE ADULT - SUBJECTIVE AND OBJECTIVE BOX
Patient is a 73y old  Female who presents with a chief complaint of right hip pain (2019 20:14)      SUBJECTIVE / OVERNIGHT EVENTS:  patient admit overnight for R thigh pain and unable to ambulate.   Patient seen and examined at bedside. still complaining of R thigh pain.   No fever, chill, SOB, CP, N/V, diarrhea, abdominal pain or dysuria.       MEDICATIONS  (STANDING):  anastrozole 1 milliGRAM(s) Oral at bedtime  buDESOnide 160 MICROgram(s)/formoterol 4.5 MICROgram(s) Inhaler 2 Puff(s) Inhalation two times a day  cholecalciferol 1000 Unit(s) Oral daily  diltiazem    milliGRAM(s) Oral daily  docusate sodium 100 milliGRAM(s) Oral three times a day  enoxaparin Injectable 40 milliGRAM(s) SubCutaneous every 24 hours  ketorolac   Injectable 15 milliGRAM(s) IV Push every 6 hours  losartan 50 milliGRAM(s) Oral daily  pantoprazole    Tablet 40 milliGRAM(s) Oral before breakfast  polyethylene glycol 3350 17 Gram(s) Oral daily  senna 2 Tablet(s) Oral at bedtime  sodium chloride 0.9%. 1000 milliLiter(s) (100 mL/Hr) IV Continuous <Continuous>  triamterene 37.5 mG/hydrochlorothiazide 25 mG Capsule 1 Capsule(s) Oral <User Schedule>    MEDICATIONS  (PRN):  bisacodyl Suppository 10 milliGRAM(s) Rectal daily PRN Constipation  cyclobenzaprine 10 milliGRAM(s) Oral three times a day PRN Muscle Spasm  simethicone 80 milliGRAM(s) Chew three times a day PRN Gas  zolpidem 5 milliGRAM(s) Oral at bedtime PRN Insomnia      PHYSICAL EXAM:  T(C): 36.7 (19 @ 06:18), Max: 36.9 (19 @ 02:00)  HR: 80 (19 @ 06:18) (59 - 94)  BP: 154/90 (19 @ 06:18) (140/70 - 159/90)  RR: 16 (19 @ 06:18) (16 - 18)  SpO2: 97% (19 @ 06:18) (94% - 100%)  I&O's Summary    Gen: NAD; resting in bed;  Pulm: no respiratory distress; CTA b/l; no wheezing  Card: RRR; S1/S2 wnl; no obvious murmurs  Abd: soft; +bs; NT/ND  Ext: no joint swelling; trace edema; ttp over right midthigh. no pain with passive movement of knee and R hip. unable to bare weight due to pain.       LABS:  CAPILLARY BLOOD GLUCOSE                              14.6   6.49  )-----------( 158      ( 2019 09:40 )             44.4     04-    141  |  103  |  11  ----------------------------<  114<H>  4.7   |  24  |  0.72    Ca    9.8      2019 09:40    TPro  7.5  /  Alb  3.9  /  TBili  1.0  /  DBili  x   /  AST  33<H>  /  ALT  15  /  AlkPhos  70  04-11    PT/INR - ( 2019 06:47 )   PT: 12.8 SEC;   INR: 1.15          PTT - ( 2019 06:47 )  PTT:29.9 SEC  CARDIAC MARKERS ( 2019 09:40 )  x     / x     / 160 u/L / x     / x          Urinalysis Basic - ( 2019 22:10 )    Color: YELLOW / Appearance: CLEAR / S.017 / pH: 6.5  Gluc: NEGATIVE / Ketone: NEGATIVE  / Bili: NEGATIVE / Urobili: NORMAL   Blood: NEGATIVE / Protein: 10 / Nitrite: NEGATIVE   Leuk Esterase: MODERATE / RBC: 0-2 / WBC 6-10   Sq Epi: MODERATE / Non Sq Epi: x / Bacteria: NEGATIVE        RADIOLOGY & ADDITIONAL TESTS:    Imaging Personally Reviewed:    Consultant(s) Notes Reviewed:      Care Discussed with Consultants/Other Providers:

## 2019-04-12 NOTE — CONSULT NOTE ADULT - ASSESSMENT
right thigh pain  -given h/o breast CAm, would check MRI right femur- pain is not characteristic of hip OA and given that only with WB not of spine pathology  continue Toradol

## 2019-04-13 DIAGNOSIS — K59.01 SLOW TRANSIT CONSTIPATION: ICD-10-CM

## 2019-04-13 LAB
BACTERIA UR CULT: SIGNIFICANT CHANGE UP
SPECIMEN SOURCE: SIGNIFICANT CHANGE UP

## 2019-04-13 PROCEDURE — 99233 SBSQ HOSP IP/OBS HIGH 50: CPT

## 2019-04-13 RX ADMIN — Medication 1000 UNIT(S): at 12:12

## 2019-04-13 RX ADMIN — Medication 15 MILLIGRAM(S): at 13:02

## 2019-04-13 RX ADMIN — Medication 100 MILLIGRAM(S): at 12:12

## 2019-04-13 RX ADMIN — OXYCODONE HYDROCHLORIDE 5 MILLIGRAM(S): 5 TABLET ORAL at 08:21

## 2019-04-13 RX ADMIN — Medication 15 MILLIGRAM(S): at 01:00

## 2019-04-13 RX ADMIN — Medication 180 MILLIGRAM(S): at 05:47

## 2019-04-13 RX ADMIN — BUDESONIDE AND FORMOTEROL FUMARATE DIHYDRATE 2 PUFF(S): 160; 4.5 AEROSOL RESPIRATORY (INHALATION) at 08:23

## 2019-04-13 RX ADMIN — ENOXAPARIN SODIUM 40 MILLIGRAM(S): 100 INJECTION SUBCUTANEOUS at 13:05

## 2019-04-13 RX ADMIN — Medication 15 MILLIGRAM(S): at 05:45

## 2019-04-13 RX ADMIN — ANASTROZOLE 1 MILLIGRAM(S): 1 TABLET ORAL at 23:20

## 2019-04-13 RX ADMIN — Medication 100 MILLIGRAM(S): at 05:47

## 2019-04-13 RX ADMIN — Medication 15 MILLIGRAM(S): at 06:17

## 2019-04-13 RX ADMIN — Medication 15 MILLIGRAM(S): at 12:12

## 2019-04-13 RX ADMIN — Medication 15 MILLIGRAM(S): at 18:17

## 2019-04-13 RX ADMIN — OXYCODONE HYDROCHLORIDE 5 MILLIGRAM(S): 5 TABLET ORAL at 09:13

## 2019-04-13 RX ADMIN — PANTOPRAZOLE SODIUM 40 MILLIGRAM(S): 20 TABLET, DELAYED RELEASE ORAL at 06:03

## 2019-04-13 RX ADMIN — Medication 15 MILLIGRAM(S): at 19:57

## 2019-04-13 RX ADMIN — POLYETHYLENE GLYCOL 3350 17 GRAM(S): 17 POWDER, FOR SOLUTION ORAL at 12:13

## 2019-04-13 RX ADMIN — BUDESONIDE AND FORMOTEROL FUMARATE DIHYDRATE 2 PUFF(S): 160; 4.5 AEROSOL RESPIRATORY (INHALATION) at 22:13

## 2019-04-13 RX ADMIN — LOSARTAN POTASSIUM 50 MILLIGRAM(S): 100 TABLET, FILM COATED ORAL at 05:48

## 2019-04-13 RX ADMIN — Medication 15 MILLIGRAM(S): at 00:09

## 2019-04-13 NOTE — PROGRESS NOTE ADULT - SUBJECTIVE AND OBJECTIVE BOX
Patient is a 73y old  Female who presents with a chief complaint of right hip pain (2019 12:48)      SUBJECTIVE / OVERNIGHT EVENTS:  No acute events overnight. Patient seen and examined at bedside. no new complaints. still having the RLE pain which exacerbates with weight baring. minimal at rest.   No fever, chill, SOB, CP, N/V, diarrhea, abdominal pain or dysuria. +constipation.      MEDICATIONS  (STANDING):  anastrozole 1 milliGRAM(s) Oral at bedtime  buDESOnide 160 MICROgram(s)/formoterol 4.5 MICROgram(s) Inhaler 2 Puff(s) Inhalation two times a day  cholecalciferol 1000 Unit(s) Oral daily  diltiazem    milliGRAM(s) Oral daily  docusate sodium 100 milliGRAM(s) Oral three times a day  enoxaparin Injectable 40 milliGRAM(s) SubCutaneous every 24 hours  ketorolac   Injectable 15 milliGRAM(s) IV Push every 6 hours  losartan 50 milliGRAM(s) Oral daily  pantoprazole    Tablet 40 milliGRAM(s) Oral before breakfast  polyethylene glycol 3350 17 Gram(s) Oral daily  senna 2 Tablet(s) Oral at bedtime  triamterene 37.5 mG/hydrochlorothiazide 25 mG Capsule 1 Capsule(s) Oral <User Schedule>    MEDICATIONS  (PRN):  bisacodyl Suppository 10 milliGRAM(s) Rectal daily PRN Constipation  cyclobenzaprine 10 milliGRAM(s) Oral three times a day PRN Muscle Spasm  HYDROmorphone  Injectable 0.5 milliGRAM(s) IV Push every 4 hours PRN Severe Pain (7 - 10)  oxyCODONE    IR 5 milliGRAM(s) Oral every 4 hours PRN Moderate Pain (4 - 6)  simethicone 80 milliGRAM(s) Chew three times a day PRN Gas  zolpidem 5 milliGRAM(s) Oral at bedtime PRN Insomnia      PHYSICAL EXAM:  T(C): 37.2 (19 @ 05:15), Max: 37.2 (19 @ 05:15)  HR: 74 (19 @ 05:15) (72 - 77)  BP: 164/84 (19 @ 05:15) (115/84 - 177/97)  RR: 18 (19 @ 05:15) (17 - 18)  SpO2: 94% (19 @ 05:15) (94% - 96%)  I&O's Summary    2019 07:01  -  2019 07:00  --------------------------------------------------------  IN: 1200 mL / OUT: 800 mL / NET: 400 mL      Gen: NAD; resting in bed;  Pulm: no respiratory distress; CTA b/l; no wheezing  Card: RRR; S1/S2 wnl; no obvious murmurs  Abd: soft; +bs; NT/ND  Ext: no joint swelling; trade pitting edema of LE.   Devices: no peter, no central line        LABS:  CAPILLARY BLOOD GLUCOSE                              14.6   6.49  )-----------( 158      ( 2019 09:40 )             44.4     -    141  |  103  |  11  ----------------------------<  114<H>  4.7   |  24  |  0.72    Ca    9.8      2019 09:40    TPro  7.5  /  Alb  3.9  /  TBili  1.0  /  DBili  x   /  AST  33<H>  /  ALT  15  /  AlkPhos  70  04-11    PT/INR - ( 2019 06:47 )   PT: 12.8 SEC;   INR: 1.15          PTT - ( 2019 06:47 )  PTT:29.9 SEC  CARDIAC MARKERS ( 2019 09:40 )  x     / x     / 160 u/L / x     / x          Urinalysis Basic - ( 2019 22:10 )    Color: YELLOW / Appearance: CLEAR / S.017 / pH: 6.5  Gluc: NEGATIVE / Ketone: NEGATIVE  / Bili: NEGATIVE / Urobili: NORMAL   Blood: NEGATIVE / Protein: 10 / Nitrite: NEGATIVE   Leuk Esterase: MODERATE / RBC: 0-2 / WBC 6-10   Sq Epi: MODERATE / Non Sq Epi: x / Bacteria: NEGATIVE        RADIOLOGY & ADDITIONAL TESTS:    Imaging Personally Reviewed:    Consultant(s) Notes Reviewed:      Care Discussed with Consultants/Other Providers: Patient is a 73y old  Female who presents with a chief complaint of right hip pain (2019 12:48)      SUBJECTIVE / OVERNIGHT EVENTS:  No acute events overnight. Patient seen and examined at bedside. no new complaints. still having the RLE pain which exacerbates with weight baring. minimal at rest.   No fever, chill, SOB, CP, N/V, diarrhea, abdominal pain or dysuria. +constipation.      MEDICATIONS  (STANDING):  anastrozole 1 milliGRAM(s) Oral at bedtime  buDESOnide 160 MICROgram(s)/formoterol 4.5 MICROgram(s) Inhaler 2 Puff(s) Inhalation two times a day  cholecalciferol 1000 Unit(s) Oral daily  diltiazem    milliGRAM(s) Oral daily  docusate sodium 100 milliGRAM(s) Oral three times a day  enoxaparin Injectable 40 milliGRAM(s) SubCutaneous every 24 hours  ketorolac   Injectable 15 milliGRAM(s) IV Push every 6 hours  losartan 50 milliGRAM(s) Oral daily  pantoprazole    Tablet 40 milliGRAM(s) Oral before breakfast  polyethylene glycol 3350 17 Gram(s) Oral daily  senna 2 Tablet(s) Oral at bedtime  triamterene 37.5 mG/hydrochlorothiazide 25 mG Capsule 1 Capsule(s) Oral <User Schedule>    MEDICATIONS  (PRN):  bisacodyl Suppository 10 milliGRAM(s) Rectal daily PRN Constipation  cyclobenzaprine 10 milliGRAM(s) Oral three times a day PRN Muscle Spasm  HYDROmorphone  Injectable 0.5 milliGRAM(s) IV Push every 4 hours PRN Severe Pain (7 - 10)  oxyCODONE    IR 5 milliGRAM(s) Oral every 4 hours PRN Moderate Pain (4 - 6)  simethicone 80 milliGRAM(s) Chew three times a day PRN Gas  zolpidem 5 milliGRAM(s) Oral at bedtime PRN Insomnia      PHYSICAL EXAM:  T(C): 37.2 (19 @ 05:15), Max: 37.2 (19 @ 05:15)  HR: 74 (19 @ 05:15) (72 - 77)  BP: 164/84 (19 @ 05:15) (115/84 - 177/97)  RR: 18 (19 @ 05:15) (17 - 18)  SpO2: 94% (19 @ 05:15) (94% - 96%)  I&O's Summary    2019 07:01  -  2019 07:00  --------------------------------------------------------  IN: 1200 mL / OUT: 800 mL / NET: 400 mL      Gen: NAD; resting in bed;  Pulm: no respiratory distress; CTA b/l; no wheezing  Card: RRR; S1/S2 wnl; no obvious murmurs  Abd: soft; +bs; NT/ND  Ext: no joint swelling; trade pitting edema of LE. +ttp right mid thigh	        LABS:  CAPILLARY BLOOD GLUCOSE                              14.6   6.49  )-----------( 158      ( 2019 09:40 )             44.4     -    141  |  103  |  11  ----------------------------<  114<H>  4.7   |  24  |  0.72    Ca    9.8      2019 09:40    TPro  7.5  /  Alb  3.9  /  TBili  1.0  /  DBili  x   /  AST  33<H>  /  ALT  15  /  AlkPhos  70  04-11    PT/INR - ( 2019 06:47 )   PT: 12.8 SEC;   INR: 1.15          PTT - ( 2019 06:47 )  PTT:29.9 SEC  CARDIAC MARKERS ( 2019 09:40 )  x     / x     / 160 u/L / x     / x          Urinalysis Basic - ( 2019 22:10 )    Color: YELLOW / Appearance: CLEAR / S.017 / pH: 6.5  Gluc: NEGATIVE / Ketone: NEGATIVE  / Bili: NEGATIVE / Urobili: NORMAL   Blood: NEGATIVE / Protein: 10 / Nitrite: NEGATIVE   Leuk Esterase: MODERATE / RBC: 0-2 / WBC 6-10   Sq Epi: MODERATE / Non Sq Epi: x / Bacteria: NEGATIVE        RADIOLOGY & ADDITIONAL TESTS:    Imaging Personally Reviewed:    Consultant(s) Notes Reviewed:      Care Discussed with Consultants/Other Providers:

## 2019-04-14 ENCOUNTER — TRANSCRIPTION ENCOUNTER (OUTPATIENT)
Age: 74
End: 2019-04-14

## 2019-04-14 DIAGNOSIS — R31.9 HEMATURIA, UNSPECIFIED: ICD-10-CM

## 2019-04-14 LAB
HAV IGM SER-ACNC: NONREACTIVE — SIGNIFICANT CHANGE UP
HBV CORE IGM SER-ACNC: NONREACTIVE — SIGNIFICANT CHANGE UP
HBV SURFACE AG SER-ACNC: NONREACTIVE — SIGNIFICANT CHANGE UP
HCV AB S/CO SERPL IA: 0.11 S/CO — SIGNIFICANT CHANGE UP (ref 0–0.99)
HCV AB SERPL-IMP: SIGNIFICANT CHANGE UP
HIV COMBO RESULT: SIGNIFICANT CHANGE UP
HIV1+2 AB SPEC QL: SIGNIFICANT CHANGE UP

## 2019-04-14 PROCEDURE — 99233 SBSQ HOSP IP/OBS HIGH 50: CPT

## 2019-04-14 RX ORDER — LIDOCAINE 4 G/100G
1 CREAM TOPICAL DAILY
Qty: 0 | Refills: 0 | Status: DISCONTINUED | OUTPATIENT
Start: 2019-04-14 | End: 2019-04-16

## 2019-04-14 RX ORDER — OXYCODONE HYDROCHLORIDE 5 MG/1
10 TABLET ORAL EVERY 4 HOURS
Qty: 0 | Refills: 0 | Status: DISCONTINUED | OUTPATIENT
Start: 2019-04-14 | End: 2019-04-16

## 2019-04-14 RX ADMIN — PANTOPRAZOLE SODIUM 40 MILLIGRAM(S): 20 TABLET, DELAYED RELEASE ORAL at 08:18

## 2019-04-14 RX ADMIN — Medication 15 MILLIGRAM(S): at 10:40

## 2019-04-14 RX ADMIN — LIDOCAINE 1 PATCH: 4 CREAM TOPICAL at 18:22

## 2019-04-14 RX ADMIN — HYDROMORPHONE HYDROCHLORIDE 0.5 MILLIGRAM(S): 2 INJECTION INTRAMUSCULAR; INTRAVENOUS; SUBCUTANEOUS at 21:30

## 2019-04-14 RX ADMIN — Medication 180 MILLIGRAM(S): at 05:27

## 2019-04-14 RX ADMIN — BUDESONIDE AND FORMOTEROL FUMARATE DIHYDRATE 2 PUFF(S): 160; 4.5 AEROSOL RESPIRATORY (INHALATION) at 10:41

## 2019-04-14 RX ADMIN — ENOXAPARIN SODIUM 40 MILLIGRAM(S): 100 INJECTION SUBCUTANEOUS at 10:40

## 2019-04-14 RX ADMIN — Medication 15 MILLIGRAM(S): at 11:15

## 2019-04-14 RX ADMIN — Medication 1000 UNIT(S): at 10:40

## 2019-04-14 RX ADMIN — HYDROMORPHONE HYDROCHLORIDE 0.5 MILLIGRAM(S): 2 INJECTION INTRAMUSCULAR; INTRAVENOUS; SUBCUTANEOUS at 18:23

## 2019-04-14 RX ADMIN — Medication 1 MILLIGRAM(S): at 21:30

## 2019-04-14 RX ADMIN — LIDOCAINE 1 PATCH: 4 CREAM TOPICAL at 15:05

## 2019-04-14 RX ADMIN — LOSARTAN POTASSIUM 50 MILLIGRAM(S): 100 TABLET, FILM COATED ORAL at 05:27

## 2019-04-14 RX ADMIN — LIDOCAINE 1 PATCH: 4 CREAM TOPICAL at 21:41

## 2019-04-14 RX ADMIN — Medication 15 MILLIGRAM(S): at 01:33

## 2019-04-14 RX ADMIN — HYDROMORPHONE HYDROCHLORIDE 0.5 MILLIGRAM(S): 2 INJECTION INTRAMUSCULAR; INTRAVENOUS; SUBCUTANEOUS at 17:16

## 2019-04-14 NOTE — DISCHARGE NOTE PROVIDER - NSDCCPCAREPLAN_GEN_ALL_CORE_FT
PRINCIPAL DISCHARGE DIAGNOSIS  Diagnosis: Hip pain  Assessment and Plan of Treatment:       SECONDARY DISCHARGE DIAGNOSES  Diagnosis: Asthma, unspecified asthma severity, unspecified whether complicated, unspecified whether persistent  Assessment and Plan of Treatment:     Diagnosis: Essential hypertension  Assessment and Plan of Treatment:     Diagnosis: Slow transit constipation  Assessment and Plan of Treatment: PRINCIPAL DISCHARGE DIAGNOSIS  Diagnosis: Hip pain  Assessment and Plan of Treatment: You had an MRI of the Pelvis. No fracture, dislocation, lesions noted.    Recommendation for physical therapy at rehabiliation center.  Follow up with Orthopedic surgeon when discharged from rehab center.      SECONDARY DISCHARGE DIAGNOSES  Diagnosis: Essential hypertension  Assessment and Plan of Treatment:     Diagnosis: Asthma, unspecified asthma severity, unspecified whether complicated, unspecified whether persistent  Assessment and Plan of Treatment:     Diagnosis: Slow transit constipation  Assessment and Plan of Treatment: PRINCIPAL DISCHARGE DIAGNOSIS  Diagnosis: Hip pain  Assessment and Plan of Treatment: You had an MRI of the Pelvis. No fracture, dislocation, lesions noted.    Recommendation for physical therapy at rehabiliation center.  Follow up with Orthopedic surgeon when discharged from rehab center.      SECONDARY DISCHARGE DIAGNOSES  Diagnosis: Essential hypertension  Assessment and Plan of Treatment: Target blood pressure below 140/90.  Continue antihypertensive medication as prescribed.  report any dizziness, blurred vision, headaches to your PCP.  Follow up with your primary medical doctor for blood pressure check within 1-2 weeks of discharge from hospital.    Diagnosis: Slow transit constipation  Assessment and Plan of Treatment: Continue bowel regimen as prescribed.

## 2019-04-14 NOTE — DISCHARGE NOTE PROVIDER - HOSPITAL COURSE
74 yo f with acute right hip pain with no plain film evidence of fracture/ dislocation              Hospital Course:    Pain of right hip joint     - referred pain from lumbar spine    - standing Toradol for next 24 hours    - flexeril PRN     - US RLE negative for DVT     - PM&R Dr Prater cs--Recs: given h/o breast CA, would check MRI right femur- pain is not characteristic of hip OA and given that only with WB not of spine pathology continue Toradol    -MRI of Pelvis     No fractures dislocations or impending pathologic fractures.    Nonspecific tiny focus of decreased T1 and increased T2 signal marrow     alteration in medial right femoral head near foveal region, possibly a     degenerative cyst or small focus of AVN, neoplastic etiology felt less     likely.     No discrete osseous or soft tissue masses or pathologic adenopathy.            HTN    - cardizem, losartan         Asthma    - advair        Hematuria    - UA negative for hematuria, non-suggestive of infection    - CT renal stone hunt with no uropathic abnormalities save for BL renal cysts    - US renal cancelled - no hematuria in UA         breast CA    - anastrozole         Dispo- Stable for discharge to rehab 72 yo f with acute right hip pain with no plain film evidence of fracture/ dislocation              Hospital Course:    Pain of right hip joint     - referred pain from lumbar spine    - standing Toradol for next 24 hours    - flexeril PRN     - US RLE negative for DVT     - PM&R Dr Prater cs--Recs: given h/o breast CA, would check MRI right femur- pain is not characteristic of hip OA and given that only with WB not of spine pathology continue Toradol    -MRI of Pelvis     No fractures dislocations or impending pathologic fractures.    Nonspecific tiny focus of decreased T1 and increased T2 signal marrow     alteration in medial right femoral head near foveal region, possibly a     degenerative cyst or small focus of AVN, neoplastic etiology felt less     likely.     No discrete osseous or soft tissue masses or pathologic adenopathy.            HTN    - cardizem, losartan         Asthma    - advair        Hematuria    - UA negative for hematuria, non-suggestive of infection    - CT renal stone hunt with no uropathic abnormalities save for BL renal cysts    - US renal cancelled - no hematuria in UA         breast CA    - anastrozole         Dispo-  stable for discharge to rehab        Dispo- Stable for discharge to rehab 74 yo f with hx of right breast ca s/p partial mastectomy on anastrozole, htn, well-controlled asthma here acute right hip pain likely d/t OA.             Hospital Course:        Pain of right hip joint     Pt underwent extensive workup as below, no acute pathology identified .Pain  possibly d/t progressive osteoarthritis of the hip    - US RLE negative for DVT     - XR hip No fracture or joint dislocation     -MRI of Pelvis     No fractures dislocations or impending pathologic fractures.    Nonspecific tiny focus of decreased T1 and increased T2 signal marrow alteration in medial right femoral head near foveal region, possibly a degenerative cyst or small focus of AVN, neoplastic etiology felt less     likely. No discrete osseous or soft tissue masses or pathologic adenopathy.        Pt received lidocaine patch and oxycodone 10 as need with adequate pain control. She was evaluated by PM&R and PT, who recommended SALVATORE.         HTN    - cardizem, losartan         Asthma    - advair        Hematuria    - UA negative for hematuria, non-suggestive of infection    - CT renal stone hunt with no uropathic abnormalities save for BL renal cysts        breast CA    - anastrozole         Dispo-  stable for discharge to rehab

## 2019-04-14 NOTE — PROGRESS NOTE ADULT - SUBJECTIVE AND OBJECTIVE BOX
Patient is a 73y old  Female who presents with a chief complaint of right hip pain (13 Apr 2019 09:31)      SUBJECTIVE / OVERNIGHT EVENTS:  No acute events overnight. Patient seen and examined at bedside. no new complaints. had BM yesterday. will increase pain meds for better control.   No fever, chill, SOB, CP, N/V, diarrhea, abdominal pain or dysuria.       MEDICATIONS  (STANDING):  anastrozole 1 milliGRAM(s) Oral at bedtime  buDESOnide 160 MICROgram(s)/formoterol 4.5 MICROgram(s) Inhaler 2 Puff(s) Inhalation two times a day  cholecalciferol 1000 Unit(s) Oral daily  diltiazem    milliGRAM(s) Oral daily  docusate sodium 100 milliGRAM(s) Oral three times a day  enoxaparin Injectable 40 milliGRAM(s) SubCutaneous every 24 hours  lidocaine   Patch 1 Patch Transdermal daily  losartan 50 milliGRAM(s) Oral daily  pantoprazole    Tablet 40 milliGRAM(s) Oral before breakfast  polyethylene glycol 3350 17 Gram(s) Oral daily  senna 2 Tablet(s) Oral at bedtime  triamterene 37.5 mG/hydrochlorothiazide 25 mG Capsule 1 Capsule(s) Oral <User Schedule>    MEDICATIONS  (PRN):  bisacodyl Suppository 10 milliGRAM(s) Rectal daily PRN Constipation  HYDROmorphone  Injectable 0.5 milliGRAM(s) IV Push every 4 hours PRN Severe Pain (7 - 10)  oxyCODONE    IR 10 milliGRAM(s) Oral every 4 hours PRN Moderate Pain (4 - 6)  simethicone 80 milliGRAM(s) Chew three times a day PRN Gas  zolpidem 5 milliGRAM(s) Oral at bedtime PRN Insomnia      PHYSICAL EXAM:  T(C): 36.7 (04-14-19 @ 14:42), Max: 36.7 (04-14-19 @ 05:25)  HR: 69 (04-14-19 @ 14:42) (64 - 69)  BP: 159/93 (04-14-19 @ 14:42) (143/62 - 160/90)  RR: 18 (04-14-19 @ 14:42) (18 - 18)  SpO2: 94% (04-14-19 @ 14:42) (94% - 96%)  I&O's Summary    Gen: NAD; resting in bed;  Pulm: no respiratory distress; CTA b/l; no wheezing  Card: RRR; S1/S2 wnl; no obvious murmurs  Abd: soft; +bs; NT/ND  Ext: ttp mid right thigh    LABS:  CAPILLARY BLOOD GLUCOSE                          RADIOLOGY & ADDITIONAL TESTS:    Imaging Personally Reviewed:    Consultant(s) Notes Reviewed:      Care Discussed with Consultants/Other Providers:

## 2019-04-15 DIAGNOSIS — C50.911 MALIGNANT NEOPLASM OF UNSPECIFIED SITE OF RIGHT FEMALE BREAST: ICD-10-CM

## 2019-04-15 DIAGNOSIS — Z29.9 ENCOUNTER FOR PROPHYLACTIC MEASURES, UNSPECIFIED: ICD-10-CM

## 2019-04-15 PROCEDURE — 72196 MRI PELVIS W/DYE: CPT | Mod: 26

## 2019-04-15 PROCEDURE — 99233 SBSQ HOSP IP/OBS HIGH 50: CPT

## 2019-04-15 RX ADMIN — Medication 1 CAPSULE(S): at 10:01

## 2019-04-15 RX ADMIN — ZOLPIDEM TARTRATE 5 MILLIGRAM(S): 10 TABLET ORAL at 00:58

## 2019-04-15 RX ADMIN — BUDESONIDE AND FORMOTEROL FUMARATE DIHYDRATE 2 PUFF(S): 160; 4.5 AEROSOL RESPIRATORY (INHALATION) at 10:00

## 2019-04-15 RX ADMIN — OXYCODONE HYDROCHLORIDE 10 MILLIGRAM(S): 5 TABLET ORAL at 09:48

## 2019-04-15 RX ADMIN — ENOXAPARIN SODIUM 40 MILLIGRAM(S): 100 INJECTION SUBCUTANEOUS at 09:53

## 2019-04-15 RX ADMIN — LIDOCAINE 1 PATCH: 4 CREAM TOPICAL at 20:06

## 2019-04-15 RX ADMIN — OXYCODONE HYDROCHLORIDE 10 MILLIGRAM(S): 5 TABLET ORAL at 11:00

## 2019-04-15 RX ADMIN — OXYCODONE HYDROCHLORIDE 10 MILLIGRAM(S): 5 TABLET ORAL at 00:58

## 2019-04-15 RX ADMIN — LIDOCAINE 1 PATCH: 4 CREAM TOPICAL at 10:01

## 2019-04-15 RX ADMIN — ANASTROZOLE 1 MILLIGRAM(S): 1 TABLET ORAL at 01:03

## 2019-04-15 RX ADMIN — Medication 100 MILLIGRAM(S): at 09:51

## 2019-04-15 RX ADMIN — POLYETHYLENE GLYCOL 3350 17 GRAM(S): 17 POWDER, FOR SOLUTION ORAL at 13:40

## 2019-04-15 RX ADMIN — ANASTROZOLE 1 MILLIGRAM(S): 1 TABLET ORAL at 21:34

## 2019-04-15 RX ADMIN — Medication 100 MILLIGRAM(S): at 13:40

## 2019-04-15 RX ADMIN — Medication 1000 UNIT(S): at 10:01

## 2019-04-15 RX ADMIN — LOSARTAN POTASSIUM 50 MILLIGRAM(S): 100 TABLET, FILM COATED ORAL at 10:00

## 2019-04-15 RX ADMIN — LIDOCAINE 1 PATCH: 4 CREAM TOPICAL at 21:35

## 2019-04-15 RX ADMIN — BUDESONIDE AND FORMOTEROL FUMARATE DIHYDRATE 2 PUFF(S): 160; 4.5 AEROSOL RESPIRATORY (INHALATION) at 21:34

## 2019-04-15 RX ADMIN — PANTOPRAZOLE SODIUM 40 MILLIGRAM(S): 20 TABLET, DELAYED RELEASE ORAL at 06:58

## 2019-04-15 RX ADMIN — OXYCODONE HYDROCHLORIDE 10 MILLIGRAM(S): 5 TABLET ORAL at 01:32

## 2019-04-15 RX ADMIN — Medication 180 MILLIGRAM(S): at 10:00

## 2019-04-15 NOTE — CHART NOTE - NSCHARTNOTEFT_GEN_A_CORE
Spoke to patient and her daughter at bedside who was concerned about her mother mentation post Ativan 1mg IV pre- MRI. Patient is awake and alert, a bit loopy, no acute distress noted. Ambien HS was discontinued as per daughter's request.  Will continue to monitor for mentation improvement and pain management.

## 2019-04-15 NOTE — PROGRESS NOTE ADULT - SUBJECTIVE AND OBJECTIVE BOX
Patient is a 73y old  Female who presents with a chief complaint of right hip pain (14 Apr 2019 17:03)      SUBJECTIVE / OVERNIGHT EVENTS:    Pt cont to c/o R hip/thigh pain worse with weight bearing     Review of Systems:    RESPIRATORY: No cough, wheezing, chills or hemoptysis; No shortness of breath  CARDIOVASCULAR: No chest pain, palpitations, dizziness, or leg swelling  GASTROINTESTINAL: No abdominal or epigastric pain. No nausea, vomiting, or hematemesis; No diarrhea or constipation. No melena or hematochezia.    MEDICATIONS  (STANDING):  anastrozole 1 milliGRAM(s) Oral at bedtime  buDESOnide 160 MICROgram(s)/formoterol 4.5 MICROgram(s) Inhaler 2 Puff(s) Inhalation two times a day  cholecalciferol 1000 Unit(s) Oral daily  diltiazem    milliGRAM(s) Oral daily  docusate sodium 100 milliGRAM(s) Oral three times a day  enoxaparin Injectable 40 milliGRAM(s) SubCutaneous every 24 hours  lidocaine   Patch 1 Patch Transdermal daily  losartan 50 milliGRAM(s) Oral daily  pantoprazole    Tablet 40 milliGRAM(s) Oral before breakfast  polyethylene glycol 3350 17 Gram(s) Oral daily  senna 2 Tablet(s) Oral at bedtime  triamterene 37.5 mG/hydrochlorothiazide 25 mG Capsule 1 Capsule(s) Oral <User Schedule>    MEDICATIONS  (PRN):  bisacodyl Suppository 10 milliGRAM(s) Rectal daily PRN Constipation  HYDROmorphone  Injectable 0.5 milliGRAM(s) IV Push every 4 hours PRN Severe Pain (7 - 10)  oxyCODONE    IR 10 milliGRAM(s) Oral every 4 hours PRN Moderate Pain (4 - 6)  simethicone 80 milliGRAM(s) Chew three times a day PRN Gas  zolpidem 5 milliGRAM(s) Oral at bedtime PRN Insomnia      PHYSICAL EXAM:  T(C): 36.8 (04-15-19 @ 15:47), Max: 36.8 (04-14-19 @ 17:14)  HR: 76 (04-15-19 @ 15:47) (68 - 87)  BP: 138/87 (04-15-19 @ 15:47) (138/87 - 173/103)  RR: 16 (04-15-19 @ 15:47) (16 - 18)  SpO2: 93% (04-15-19 @ 15:47) (93% - 100%)  I&O's Summary    GENERAL: obese female lying in bed in NAD   MENTAL STATUS/PSYCH:  AAO x3   HEAD:  Atraumatic, Normocephalic  EYES: EOMI, PERRLA, conjunctiva and sclera clear  NECK: Supple, No elevated JVD  CHEST/LUNG: Clear to auscultation bilaterally; No wheeze  HEART: Regular rate and rhythm; No murmurs, rubs, or gallops  ABDOMEN: Soft, Nontender, Nondistended; Bowel sounds present  EXTREMITIES:  2+ Peripheral Pulses, No clubbing, cyanosis, or edema  NEUROLOGY: CN II-XII grossly intact, moving all extremities  SKIN: No rashes or lesions    LABS:  CAPILLARY BLOOD GLUCOSE

## 2019-04-16 ENCOUNTER — TRANSCRIPTION ENCOUNTER (OUTPATIENT)
Age: 74
End: 2019-04-16

## 2019-04-16 VITALS — HEART RATE: 80 BPM | DIASTOLIC BLOOD PRESSURE: 93 MMHG | SYSTOLIC BLOOD PRESSURE: 155 MMHG

## 2019-04-16 PROCEDURE — 99239 HOSP IP/OBS DSCHRG MGMT >30: CPT

## 2019-04-16 RX ORDER — SENNA PLUS 8.6 MG/1
2 TABLET ORAL
Qty: 0 | Refills: 0 | COMMUNITY
Start: 2019-04-16

## 2019-04-16 RX ORDER — DOCUSATE SODIUM 100 MG
1 CAPSULE ORAL
Qty: 0 | Refills: 0 | COMMUNITY
Start: 2019-04-16

## 2019-04-16 RX ORDER — LIDOCAINE 4 G/100G
1 CREAM TOPICAL
Qty: 0 | Refills: 0 | COMMUNITY
Start: 2019-04-16

## 2019-04-16 RX ORDER — PANTOPRAZOLE SODIUM 20 MG/1
1 TABLET, DELAYED RELEASE ORAL
Qty: 0 | Refills: 0 | COMMUNITY
Start: 2019-04-16

## 2019-04-16 RX ORDER — OXYCODONE HYDROCHLORIDE 5 MG/1
1 TABLET ORAL
Qty: 0 | Refills: 0 | COMMUNITY
Start: 2019-04-16

## 2019-04-16 RX ADMIN — ENOXAPARIN SODIUM 40 MILLIGRAM(S): 100 INJECTION SUBCUTANEOUS at 11:25

## 2019-04-16 RX ADMIN — OXYCODONE HYDROCHLORIDE 10 MILLIGRAM(S): 5 TABLET ORAL at 14:35

## 2019-04-16 RX ADMIN — LOSARTAN POTASSIUM 50 MILLIGRAM(S): 100 TABLET, FILM COATED ORAL at 05:47

## 2019-04-16 RX ADMIN — POLYETHYLENE GLYCOL 3350 17 GRAM(S): 17 POWDER, FOR SOLUTION ORAL at 11:25

## 2019-04-16 RX ADMIN — PANTOPRAZOLE SODIUM 40 MILLIGRAM(S): 20 TABLET, DELAYED RELEASE ORAL at 05:48

## 2019-04-16 RX ADMIN — Medication 100 MILLIGRAM(S): at 11:26

## 2019-04-16 RX ADMIN — LIDOCAINE 1 PATCH: 4 CREAM TOPICAL at 11:25

## 2019-04-16 RX ADMIN — BUDESONIDE AND FORMOTEROL FUMARATE DIHYDRATE 2 PUFF(S): 160; 4.5 AEROSOL RESPIRATORY (INHALATION) at 08:22

## 2019-04-16 RX ADMIN — OXYCODONE HYDROCHLORIDE 10 MILLIGRAM(S): 5 TABLET ORAL at 08:20

## 2019-04-16 RX ADMIN — OXYCODONE HYDROCHLORIDE 10 MILLIGRAM(S): 5 TABLET ORAL at 09:20

## 2019-04-16 RX ADMIN — Medication 180 MILLIGRAM(S): at 05:47

## 2019-04-16 RX ADMIN — Medication 1000 UNIT(S): at 11:25

## 2019-04-16 NOTE — PROGRESS NOTE ADULT - PROBLEM SELECTOR PLAN 5
-UA negative for hematuria, non-suggestive of infection  -CT renal stone hunt with no uropathic abnormalities except for BL renal cysts.   -no further hematuria except 1 episode per patient.
no BM since admission  likely related to bed bound status and pain meds.   bowel regimen and titrate to daily BM.
no BM since admission  likely related to bed bound status and pain meds.   bowel regimen and titrate to daily BM.
-UA negative for hematuria, non-suggestive of infection  -CT renal stone hunt with no uropathic abnormalities except for BL renal cysts.   -no further hematuria except 1 episode per patient.

## 2019-04-16 NOTE — PROGRESS NOTE ADULT - SUBJECTIVE AND OBJECTIVE BOX
Patient is a 73y old  Female who presents with a chief complaint of right hip pain (15 Apr 2019 16:41)      SUBJECTIVE / OVERNIGHT EVENTS:    pt reports her thigh pain has improved. no new complaint     Review of Systems:    RESPIRATORY: No cough, wheezing, chills or hemoptysis; No shortness of breath  CARDIOVASCULAR: No chest pain, palpitations, dizziness, or leg swelling  GASTROINTESTINAL: No abdominal or epigastric pain. No nausea, vomiting, or hematemesis; No diarrhea or constipation. No melena or hematochezia.      MEDICATIONS  (STANDING):  anastrozole 1 milliGRAM(s) Oral at bedtime  buDESOnide 160 MICROgram(s)/formoterol 4.5 MICROgram(s) Inhaler 2 Puff(s) Inhalation two times a day  cholecalciferol 1000 Unit(s) Oral daily  diltiazem    milliGRAM(s) Oral daily  docusate sodium 100 milliGRAM(s) Oral three times a day  enoxaparin Injectable 40 milliGRAM(s) SubCutaneous every 24 hours  lidocaine   Patch 1 Patch Transdermal daily  losartan 50 milliGRAM(s) Oral daily  pantoprazole    Tablet 40 milliGRAM(s) Oral before breakfast  polyethylene glycol 3350 17 Gram(s) Oral daily  senna 2 Tablet(s) Oral at bedtime  triamterene 37.5 mG/hydrochlorothiazide 25 mG Capsule 1 Capsule(s) Oral <User Schedule>    MEDICATIONS  (PRN):  bisacodyl Suppository 10 milliGRAM(s) Rectal daily PRN Constipation  HYDROmorphone  Injectable 0.5 milliGRAM(s) IV Push every 4 hours PRN Severe Pain (7 - 10)  oxyCODONE    IR 10 milliGRAM(s) Oral every 4 hours PRN Moderate Pain (4 - 6)  simethicone 80 milliGRAM(s) Chew three times a day PRN Gas      PHYSICAL EXAM:  T(C): 37 (04-16-19 @ 02:21), Max: 37 (04-15-19 @ 18:25)  HR: 80 (04-16-19 @ 05:43) (68 - 80)  BP: 155/93 (04-16-19 @ 05:43) (138/87 - 155/93)  RR: 18 (04-16-19 @ 02:21) (16 - 18)  SpO2: 99% (04-16-19 @ 02:21) (93% - 99%)  I&O's Summary    GENERAL: obese female lying in bed in NAD   MENTAL STATUS/PSYCH:  AAO x3   HEAD:  Atraumatic, Normocephalic  EYES: EOMI, PERRLA, conjunctiva and sclera clear  NECK: Supple, No elevated JVD  CHEST/LUNG: Clear to auscultation bilaterally; No wheeze  HEART: Regular rate and rhythm; No murmurs, rubs, or gallops  ABDOMEN: Soft, Nontender, Nondistended; Bowel sounds present  EXTREMITIES:  2+ Peripheral Pulses, No clubbing, cyanosis, or edema  NEUROLOGY: CN II-XII grossly intact, moving all extremities  SKIN: No rashes or lesions    LABS:  CAPILLARY BLOOD GLUCOSE

## 2019-04-16 NOTE — PROGRESS NOTE ADULT - PROBLEM SELECTOR PROBLEM 4
Asthma, unspecified asthma severity, unspecified whether complicated, unspecified whether persistent
Hematuria, unspecified type
Asthma, unspecified asthma severity, unspecified whether complicated, unspecified whether persistent

## 2019-04-16 NOTE — PROGRESS NOTE ADULT - PROBLEM SELECTOR PLAN 6
no BM since admission  likely related to bed bound status and pain meds.   bowel regimen and titrate to daily BM.
no BM since admission  likely related to bed bound status and pain meds.   bowel regimen and titrate to daily BM.

## 2019-04-16 NOTE — PROGRESS NOTE ADULT - ASSESSMENT
72 yo f with acute right hip pain with no plain film evidence of fracture/ dislocation, now with concern for metastatic process.
74 yo f with acute right hip pain with no plain film evidence of fracture/ dislocation.
74 yo f with acute right hip pain with no plain film evidence of fracture/ dislocation.
74 yo f with hx of right breast ca s/p partial mastectomy on anastrozole, htn, well-controlled asthma here acute right hip pain with no plain film evidence of fracture/ dislocation, now with concern for metastatic process.
72 yo f with hx of right breast ca s/p partial mastectomy on anastrozole, htn, well-controlled asthma here acute right hip pain possibly d/t progressive OA

## 2019-04-16 NOTE — PROGRESS NOTE ADULT - ATTENDING COMMENTS
stable for discharge to [facility] today  total time spent on disharge 35min

## 2019-04-16 NOTE — PROGRESS NOTE ADULT - PROBLEM SELECTOR PROBLEM 5
Hematuria, unspecified type
Slow transit constipation
Slow transit constipation
Hematuria, unspecified type

## 2019-04-16 NOTE — PROGRESS NOTE ADULT - PROBLEM SELECTOR PROBLEM 1
Pain of right hip joint
none

## 2019-04-16 NOTE — PROGRESS NOTE ADULT - PROBLEM SELECTOR PLAN 4
-UA negative for hematuria, non-suggestive of infection  -CT renal stone hunt with no uropathic abnormalities except for BL renal cysts.   -no further hematuria except 1 episode per patient.
c/w advair
c/w advair

## 2019-04-16 NOTE — PROGRESS NOTE ADULT - PROBLEM SELECTOR PROBLEM 3
Asthma, unspecified asthma severity, unspecified whether complicated, unspecified whether persistent
Essential hypertension
Essential hypertension

## 2019-04-16 NOTE — DISCHARGE NOTE NURSING/CASE MANAGEMENT/SOCIAL WORK - NSDCDPATPORTLINK_GEN_ALL_CORE
You can access the [facility] Patient Portal, offered by [facility], by registering with the following website: [email or web address]

## 2019-04-16 NOTE — PROGRESS NOTE ADULT - PROBLEM SELECTOR PLAN 1
2/2 hip OA? referred pain from lumbar spine?  Doppler neg RLE.  will increase pain meds for better symptom control   bowel regimen titrate to daily BM.  PM&R recs appreciated.   MRI LE w/ contrast pending.   Pt eval - but patient unable to cooperate due to pain.
2/2 hip OA? referred pain from lumbar spine?  Doppler neg RLE.  will place on standing Toradol for next 24 hours, as well as prn flexiril  PM&R recs appreciated.   MRI LE w/ contrast pending.   Pt eval - but patient unable to cooperate due to pain.
2/2 hip OA? referred pain from lumbar spine?  will place on standing Toradol for next 24 hours, as well as prn flexiril  PM&R eval  Pt eval - but patient unable to cooperate due to pain.  Doppler neg RLE.
Etiology unclear at time OA vs. bone mets  Doppler neg RLE.  cont oxycodone prn for pain  bowel regimen titrate to daily BM.  PM&R recs appreciated.   MRI LE w/ contrast pending.   Pt eval - but patient unable to cooperate due to pain.
possibly d/t progressive OA  MR R hip/thigh reviewed, no acute pathology   Doppler neg RLE.  cont oxycodone prn for pain  bowel regimen titrate to daily BM.  PM&R recs appreciated.   Pt eval - rec SALVATORE

## 2019-08-20 PROBLEM — S93.401A SPRAIN OF RIGHT ANKLE, UNSPECIFIED LIGAMENT, INITIAL ENCOUNTER: Status: ACTIVE | Noted: 2019-08-20

## 2019-08-21 ENCOUNTER — APPOINTMENT (OUTPATIENT)
Dept: ORTHOPEDIC SURGERY | Facility: CLINIC | Age: 74
End: 2019-08-21
Payer: MEDICARE

## 2019-08-21 VITALS
HEIGHT: 63 IN | HEART RATE: 87 BPM | BODY MASS INDEX: 41.46 KG/M2 | SYSTOLIC BLOOD PRESSURE: 132 MMHG | WEIGHT: 234 LBS | DIASTOLIC BLOOD PRESSURE: 81 MMHG

## 2019-08-21 DIAGNOSIS — S93.401A SPRAIN OF UNSPECIFIED LIGAMENT OF RIGHT ANKLE, INITIAL ENCOUNTER: ICD-10-CM

## 2019-08-21 DIAGNOSIS — M21.41 FLAT FOOT [PES PLANUS] (ACQUIRED), RIGHT FOOT: ICD-10-CM

## 2019-08-21 DIAGNOSIS — M54.5 LOW BACK PAIN: ICD-10-CM

## 2019-08-21 PROCEDURE — 99202 OFFICE O/P NEW SF 15 MIN: CPT

## 2019-08-21 NOTE — PHYSICAL EXAM
[de-identified] : Physical Examination\par General: well nourished, in no acute distress, alert and oriented to person, place and time\par Psychiatric: normal mood and affect, no abnormal movements or speech patterns\par Eyes: vision intact - glasses\par Throat: no thyromegaly\par Lymph: no enlarged nodes, no lymphedema in extremity\par Respiratory: no wheezing, no shortness of breath with ambulation\par Cardiac: no cardiac leg swelling, 2+ peripheral pulses\par Neurology: normal gross sensation in extremities to light touch\par Abdomen: soft, non-tender, tympanic, no masses\par \par Musculoskeletal Examination\par Ambulation	+ right lower extremity antalgic gait externally rotated foot with pes planus, + assistive devices\par \par Pain is isolated to the posterior lateral distal lumbar with radiation to the lateral hip\par \par Patient has pes planus deformity visible despite shoe wear\par \par Significant swelling of bilateral lower extremity\par \par  [de-identified] : MRI lumbar spine from July of 2019\par

## 2019-08-21 NOTE — DISCUSSION/SUMMARY
[de-identified] : Lumbar back pain discogenic with anterior listhesis and possible referred pain to the posterior buttock\par Right foot pes planus\par \par \par The lumbar back the patient be treated by another physician recommend physical therapy\par \par For the right foot the patient already has flatfoot orthotics\par \par Recommend [place] brace if patient chooses, patient reports podiatrist is made same recommendations and patient declined use of [place] brace to do difficulty and uncomfortableness with wearing the brace\par \par Follow up p.r.n.

## 2019-08-21 NOTE — HISTORY OF PRESENT ILLNESS
[de-identified] : CC right ankle deformity lumbar back pain\par \par HPI 72 yo female presents with chronic onset of long-term activity related pain in the lumbar back with right lower extremity antalgic gait with mild foot deformity [without injury]. The pain is same, and rated a 8 out of 10, described as pain, with radiation to lateral buttocks. Rest makes the pain better and activity makes the pain worse. The patient reports associated symptoms of pain. The patient + similar pain previously . \par \par The patient has tried the following treatments:\par Activity modification	+\par Ice/Compression  	+\par Braces    		+ orthotics\par Nsaids    		+\par Physical Therapy  	- beginning therapy of her lumbar back\par Cortisone Injection	-\par Arthroscopy		-\par \par \par Review of Systems is positive for the above musculoskeletal symptoms and is otherwise non-contributory for general, constitutional, psychiatric, neurologic, HEENT, cardiac, respiratory, gastrointestinal, reproductive, lymphatic, and dermatologic complaints.\par \par Consult by Dr cabezas

## 2020-07-09 ENCOUNTER — APPOINTMENT (OUTPATIENT)
Dept: ORTHOPEDIC SURGERY | Facility: CLINIC | Age: 75
End: 2020-07-09

## 2020-07-15 ENCOUNTER — APPOINTMENT (OUTPATIENT)
Dept: ORTHOPEDIC SURGERY | Facility: CLINIC | Age: 75
End: 2020-07-15
Payer: MEDICARE

## 2020-07-15 VITALS — WEIGHT: 250 LBS | HEIGHT: 64 IN | BODY MASS INDEX: 42.68 KG/M2

## 2020-07-15 DIAGNOSIS — M17.10 UNILATERAL PRIMARY OSTEOARTHRITIS, UNSPECIFIED KNEE: ICD-10-CM

## 2020-07-15 PROCEDURE — 73562 X-RAY EXAM OF KNEE 3: CPT | Mod: 50

## 2020-07-15 PROCEDURE — 99214 OFFICE O/P EST MOD 30 MIN: CPT

## 2020-07-15 PROCEDURE — 72170 X-RAY EXAM OF PELVIS: CPT

## 2020-07-15 PROCEDURE — 72100 X-RAY EXAM L-S SPINE 2/3 VWS: CPT

## 2020-07-15 NOTE — PHYSICAL EXAM
[de-identified] : Constitutional\par o Appearance : well-nourished, well developed, alert, in no acute distress \par Head and Face\par o Head :\par ¦ Inspection : atraumatic, normocephalic\par o Face :\par ¦ Inspection : no visible rash or discoloration\par Respiratory\par o Respiratory Effort: breathing unlabored \par Neurologic\par o Mental Status Examination :\par ¦ Orientation : alert and oriented X 3\par Psychiatric\par o Mood and Affect: mood normal, affect appropriate\par Cardiovascular\par o Observation/Palpation : - no swelling\par Lymphatic\par o Additional Nodes : No palpable lymph nodes present\par \par Lumbosacral Spine\par o Inspection : no visible rash or discoloration\par o Palpation : left and right SI and paralumbar tenderness\par o Range of Motion : extension causes pain in the left leg and groin that is relieved by forward flexion, sidebending causes discomfort, rotation causes no discomfort\par o Muscle Strength : paraspinal muscle strength and tone within normal limits\par o Muscle Tone : paraspinal muscle strength and tone within normal limits\par Tests: straight leg test negative\par  \par Right Lower Extremity\par o Buttock : no tenderness, swelling or deformities\par o Right Hip :\par ¦ Inspection/Palpation : no tenderness, swelling or deformities\par ¦ Range of Motion : full and painless in all planes, no crepitance\par ¦ Stability : joint stability intact\par ¦ Strength : extension, flexion, adduction, abduction, internal rotation and external rotation 3+/5 \par \par o Right Knee :\par ¦ Inspection/Palpation : no tenderness to palpation, no swelling, well healed incisions\par ¦ Range of Motion : active flexion and extension full and painless, no crepitance\par ¦ Stability : no valgus or varus instability present on provocative testing\par ¦ Strength : flexion and extension 3+/5\par ¦ Tests and Signs : negative Anterior Drawer\par \par Left Lower Extremity\par o Buttock : no tenderness, swelling or deformities \par o Left Hip :\par ¦ Inspection/Palpation : no tenderness, swelling or deformities\par ¦ Range of Motion : full flexion and rotation with discomfort, no crepitance\par ¦ Stability : joint stability intact\par ¦ Strength : extension, flexion, adduction, abduction, internal rotation and external rotation 3-/5\par \par o Left Knee :\par ¦ Inspection/Palpation : no tenderness to palpation, no swelling, well healed incisions \par ¦ Range of Motion : active flexion and extension full and painless, no crepitance\par ¦ Stability : no valgus or varus instability present on provocative testing\par ¦ Strength : flexion and extension 3-/5\par ¦ Tests and Signs : negative Anterior Drawer\par  \par Gait: kyphotic, ambulating with walker, poor balance, no significant extremity swelling or lymphedema, no foot drop, very poor core strength, sensation in tact to light touch bilateral lower extremity\par \par Radiology Results \par o Lumbosacral Spine : AP and lateral views were obtained and reveal grade 1 spondylolisthesis of L4/L5 with severe diffuse degenerative disk disease worse at L5/S1 and foraminal stenosis at L4/L5 and L5/S1.\par o Pelvis: AP pelvis and lateral left hip revealed moderate to severe arthritis of both hips, right worse than left.\par o Right Knee : Standing AP, Lateral and skyline views of the knee were obtained and revealed excellent position of the total knee replacement with central tracking of the patella, no signs of loosening. \par o Left Knee : Standing AP, Lateral and skyline views of the knee were obtained and revealed excellent position of the total knee replacement with central tracking of the patella, no signs of loosening.

## 2020-07-15 NOTE — DISCUSSION/SUMMARY
[de-identified] : I discussed the underlying pathophysiology of the patient's condition in great detail with the patient. I went over the patient's x-rays with them in great detail. We discussed the use of ice, Tylenol and anti-inflammatories to relieve pain. The patient was instructed in ROM exercises they are to do at home. At-home strengthening, and stretching exercises were discussed and demonstrated with the patient. She  should purchase Velcro ankle weight that range from 0-5 pounds and should begin a diligent at-home exercise program. We went over the wide ranging benefits of exercise for the patient health and I encouraged her to begin a diligent exercise program. She needs to avoid high-impact activities such as running and jumping. I recommend alternative activities such as riding a stationary bike on low tension, or the elliptical. She should focus on light weight and high repetition exercises. She should avoid squatting, and kneeling. At this time, she will start a course of physical therapy for strengthening and flexibility. A prescription for physical therapy was provided. I advised her to continue to ambulating with the walker as she has poor balance and a fall could result in serious consequences. I stressed the importance of continued weight loss and its benefits to the patient’s joints and overall health. We discussed in detail that risk factors such as Diabetes, Hypertension, and Obesity are major risk factors for any COVID-19 and having serious consequences as a result. Because she has some of these risk factors I highly encouraged the patient to lose weight as fast and as healthy as possible. All of her questions were answered. She understands and consents to the plan. \par \par FU 4-6 weeks.\par after undergoing PT to strengthen her core, low back and legs.\par after continuing ambulating with a walker.

## 2020-07-15 NOTE — HISTORY OF PRESENT ILLNESS
[de-identified] : 74 year old patient is presents with low back and bilateral knee pain. She is s/p bilateral knee replacement with Dr. Short in [id number]. She has a history of low back pain. She has pain radiating down both legs and into her groin, left worse than right. She has been attending PT. The patient presents ambulating with a walker. She was taking ibuprofen 800 mg to manage her pain and switched to Mobic 15 mg yesterday after seeing her neurologist. The patient is present today with her daughter. She notes that she has had swelling and is seeing a vascular doctor tomorrow.She feels heaviness of her legs and difficulty walking any distance. \par \par MRI of the lumbosacral spine taken at [facility] on 7/24/2019 revealed: \par 1. T2 hyperintense hepatic lesions may represent cysts versus hemangiomas. Recommend abdominal ultrasound or dedicated abdominal MRI to further evaluate.\par 2. Grade 1-2 anterolisthesis of L4 on L5. Mild dextroscoliosis of the thoracolumbar spine.\par 3. L2-3: Disc bulge, prominent right-sided endplate osteophytes and right greater left facet arthropathy. Mild to moderate spinal canal stenosis and right lateral recess narrowing.\par Moderate right foraminal narrowing and impingement of exiting right L2 nerve root.\par 4. L4-5: Anterolisthesis uncovering disc material. Bilateral facet arthropathy. Mild spinal canal stenosis. Mild left and moderate to severe right foraminal narrowing and compression of exiting right L4 nerve root.\par 5. Degenerative spondylosis at other levels as above.

## 2020-09-02 ENCOUNTER — APPOINTMENT (OUTPATIENT)
Dept: ORTHOPEDIC SURGERY | Facility: CLINIC | Age: 75
End: 2020-09-02

## 2020-10-30 ENCOUNTER — APPOINTMENT (OUTPATIENT)
Dept: PULMONOLOGY | Facility: CLINIC | Age: 75
End: 2020-10-30
Payer: MEDICARE

## 2020-10-30 VITALS
TEMPERATURE: 97 F | RESPIRATION RATE: 17 BRPM | SYSTOLIC BLOOD PRESSURE: 108 MMHG | DIASTOLIC BLOOD PRESSURE: 68 MMHG | HEART RATE: 78 BPM | OXYGEN SATURATION: 99 %

## 2020-10-30 DIAGNOSIS — R06.02 SHORTNESS OF BREATH: ICD-10-CM

## 2020-10-30 DIAGNOSIS — Z23 ENCOUNTER FOR IMMUNIZATION: ICD-10-CM

## 2020-10-30 PROCEDURE — 94727 GAS DIL/WSHOT DETER LNG VOL: CPT

## 2020-10-30 PROCEDURE — 99204 OFFICE O/P NEW MOD 45 MIN: CPT | Mod: 25

## 2020-10-30 PROCEDURE — 94729 DIFFUSING CAPACITY: CPT

## 2020-10-30 PROCEDURE — 94060 EVALUATION OF WHEEZING: CPT

## 2020-10-30 RX ORDER — GABAPENTIN 100 MG/1
100 CAPSULE ORAL
Refills: 0 | Status: ACTIVE | COMMUNITY

## 2020-10-30 RX ORDER — FOLIC ACID 1 MG/1
1 TABLET ORAL
Refills: 0 | Status: ACTIVE | COMMUNITY

## 2020-10-30 RX ORDER — DILTIAZEM HYDROCHLORIDE 180 MG/1
180 CAPSULE, EXTENDED RELEASE ORAL
Refills: 0 | Status: ACTIVE | COMMUNITY

## 2020-10-30 RX ORDER — IBUPROFEN 100 MG
TABLET,CHEWABLE ORAL
Refills: 0 | Status: COMPLETED | COMMUNITY
End: 2020-10-30

## 2020-10-30 RX ORDER — FAMOTIDINE 20 MG/1
20 TABLET, FILM COATED ORAL
Refills: 0 | Status: ACTIVE | COMMUNITY

## 2020-10-30 RX ORDER — IRBESARTAN 150 MG/1
150 TABLET ORAL
Refills: 0 | Status: ACTIVE | COMMUNITY

## 2020-10-30 RX ORDER — BENZONATATE 150 MG/1
CAPSULE ORAL
Refills: 0 | Status: ACTIVE | COMMUNITY

## 2020-10-30 RX ORDER — DICLOFENAC SODIUM 75 MG/1
75 TABLET, DELAYED RELEASE ORAL
Refills: 0 | Status: ACTIVE | COMMUNITY

## 2020-10-30 RX ORDER — ANASTROZOLE TABLETS 1 MG/1
1 TABLET ORAL
Refills: 0 | Status: DISCONTINUED | COMMUNITY
End: 2020-10-30

## 2020-10-30 RX ORDER — MONTELUKAST 10 MG/1
10 TABLET, FILM COATED ORAL
Qty: 90 | Refills: 0 | Status: ACTIVE | COMMUNITY
Start: 2020-10-30 | End: 1900-01-01

## 2020-10-30 NOTE — PROCEDURE
[FreeTextEntry1] : Chest x-ray 9/22/20: No acute cardiopulmonary disease. \par \par PFT 10/30/20: Minimal obstruction. Lung volumes were normal. DLCO was moderately reduced. Significant improvement after bronchodilator noted. \par \par

## 2020-10-30 NOTE — PHYSICAL EXAM
[Neck Cervical Mass (___cm)] : no neck mass was observed [Heart Sounds] : normal S1 and S2 [Cyanosis, Localized] : no localized cyanosis [No Focal Deficits] : no focal deficits [Oriented To Time, Place, And Person] : oriented to person, place, and time [General Appearance - In No Acute Distress] : no acute distress [] : no hepato-splenomegaly [3+ Pitting] : 3+  pitting  [FreeTextEntry1] : Walks with a walker [FreeTextEntry2] : Lymphedema in the legs

## 2020-10-30 NOTE — HISTORY OF PRESENT ILLNESS
[Never] : never [TextBox_4] : She is a 74 year old woman who presented with dyspnea with minimal exertion. \par \par Getting SOB when walking from one room to the next. No chest pain, pressure or palpitations. Has a cough and wheezing. No fever, chills or sweats. No sick contacts. \par \par Recently placed on Symbicort but stated it gives her a headache. Was on Wixela. Uses ProAir prn.

## 2020-10-30 NOTE — DISCUSSION/SUMMARY
[FreeTextEntry1] : She is a 74 year old woman, a never smoker, with a history of hypertension, hyperlipidemia, lymphedema and asthma. Presented with dyspnea with minimal exertion. \par \par Getting SOB when walking from one room to the next. No chest pain, pressure or palpitations. Has a cough and wheezing. No fever, chills or sweats. No sick contacts. \par \par Recently placed on Symbicort but stated it gives her a headache. Was on Wixela. Uses ProAir prn. \par \par Active wheezing on exam. Given a short burst of prednisone. Montelukast was re-started. Advised to continue with Symbicort (try one puff Q 12 H) and albuterol as needed. \par \par May need to be diuresed more aggressively if possible. \par \par Follow up in one month. \par

## 2020-10-30 NOTE — REVIEW OF SYSTEMS
[Dry Mouth] : dry mouth [Dyspnea] : dyspnea [Wheezing] : wheezing [Hypertension] : ~T hypertension [Heartburn] : heartburn [Fever] : no fever [Cough] : no cough [Hemoptysis] : no hemoptysis [Chest Tightness] : no chest tightness [Chest Discomfort] : no chest discomfort [Palpitations] : no palpitations [Edema] : ~T edema was not present [Nasal Discharge] : no nasal discharge [Nocturia] : no nocturia [Rash] : no [unfilled] rash [Depression] : no depression [Diabetes] : no diabetes mellitus [DVT] : no DVT [Difficulty Initiating Sleep] : no difficulty falling asleep

## 2020-11-23 ENCOUNTER — APPOINTMENT (OUTPATIENT)
Dept: PULMONOLOGY | Facility: CLINIC | Age: 75
End: 2020-11-23
Payer: MEDICARE

## 2020-11-23 VITALS
RESPIRATION RATE: 18 BRPM | SYSTOLIC BLOOD PRESSURE: 116 MMHG | OXYGEN SATURATION: 94 % | WEIGHT: 252 LBS | DIASTOLIC BLOOD PRESSURE: 68 MMHG | HEART RATE: 88 BPM | BODY MASS INDEX: 43.26 KG/M2

## 2020-11-23 DIAGNOSIS — J45.909 UNSPECIFIED ASTHMA, UNCOMPLICATED: ICD-10-CM

## 2020-11-23 PROCEDURE — 99214 OFFICE O/P EST MOD 30 MIN: CPT

## 2020-11-23 RX ORDER — AZITHROMYCIN 250 MG/1
250 TABLET, FILM COATED ORAL
Qty: 6 | Refills: 0 | Status: COMPLETED | COMMUNITY
Start: 2020-07-02

## 2020-11-23 RX ORDER — DOXYCYCLINE HYCLATE 100 MG/1
100 CAPSULE ORAL
Qty: 20 | Refills: 0 | Status: COMPLETED | COMMUNITY
Start: 2020-10-02

## 2020-11-23 RX ORDER — LEVOFLOXACIN 500 MG/1
500 TABLET, FILM COATED ORAL
Qty: 1 | Refills: 0 | Status: COMPLETED | COMMUNITY
Start: 2020-09-15

## 2020-11-23 RX ORDER — AMOXICILLIN 500 MG/1
500 CAPSULE ORAL
Qty: 30 | Refills: 0 | Status: COMPLETED | COMMUNITY
Start: 2020-03-12

## 2020-11-23 RX ORDER — FLUTICASONE PROPIONATE AND SALMETEROL 250; 50 UG/1; UG/1
250-50 POWDER RESPIRATORY (INHALATION)
Qty: 60 | Refills: 0 | Status: COMPLETED | COMMUNITY
Start: 2020-07-29

## 2020-11-23 RX ORDER — CEFUROXIME AXETIL 250 MG/1
250 TABLET ORAL
Qty: 14 | Refills: 0 | Status: COMPLETED | COMMUNITY
Start: 2020-07-29

## 2020-11-23 NOTE — DISCUSSION/SUMMARY
[FreeTextEntry1] : She is a 75 year old woman, a never smoker, with a history of hypertension, hyperlipidemia, lymphedema and asthma. Presented with dyspnea with minimal exertion. Also has a history of breast cancer followed by radiation. \par \par She is feeling better on Symbicort. She was given an Aerochamber to use with Symbicort. Proper use demonstrated. \par \par Refused influenza vaccination. Will get it from Dr. Patel. \par \par Follow up in three months. \par

## 2020-11-23 NOTE — HISTORY OF PRESENT ILLNESS
[Never] : never [TextBox_4] : She is a 75 year old woman who presented with dyspnea with minimal exertion. \par \par Has been using Symbicort one puff BID. Also on montelukast. \par \par Feels a little better. \par \par

## 2020-11-23 NOTE — PHYSICAL EXAM
[General Appearance - In No Acute Distress] : no acute distress [Neck Cervical Mass (___cm)] : no neck mass was observed [Heart Sounds] : normal S1 and S2 [Cyanosis, Localized] : no localized cyanosis [3+ Pitting] : 3+  pitting  [] : no rash [No Focal Deficits] : no focal deficits [Oriented To Time, Place, And Person] : oriented to person, place, and time [FreeTextEntry1] : Walks with a walker [FreeTextEntry2] : Lymphedema in the legs

## 2021-01-05 RX ORDER — PREDNISONE 20 MG/1
20 TABLET ORAL DAILY
Qty: 10 | Refills: 0 | Status: ACTIVE | COMMUNITY
Start: 2020-10-30 | End: 1900-01-01

## 2021-03-12 ENCOUNTER — APPOINTMENT (OUTPATIENT)
Dept: PULMONOLOGY | Facility: CLINIC | Age: 76
End: 2021-03-12

## 2021-04-15 ENCOUNTER — APPOINTMENT (OUTPATIENT)
Dept: ORTHOPEDIC SURGERY | Facility: CLINIC | Age: 76
End: 2021-04-15
Payer: MEDICARE

## 2021-04-15 DIAGNOSIS — M16.0 BILATERAL PRIMARY OSTEOARTHRITIS OF HIP: ICD-10-CM

## 2021-04-15 DIAGNOSIS — M48.061 SPINAL STENOSIS, LUMBAR REGION WITHOUT NEUROGENIC CLAUDICATION: ICD-10-CM

## 2021-04-15 DIAGNOSIS — Z96.653 PRESENCE OF ARTIFICIAL KNEE JOINT, BILATERAL: ICD-10-CM

## 2021-04-15 DIAGNOSIS — M47.816 SPONDYLOSIS W/OUT MYELOPATHY OR RADICULOPATHY, LUMBAR REGION: ICD-10-CM

## 2021-04-15 DIAGNOSIS — M43.17 SPONDYLOLISTHESIS, LUMBOSACRAL REGION: ICD-10-CM

## 2021-04-15 PROCEDURE — 99214 OFFICE O/P EST MOD 30 MIN: CPT

## 2021-04-15 PROCEDURE — 72170 X-RAY EXAM OF PELVIS: CPT

## 2021-04-15 PROCEDURE — 73562 X-RAY EXAM OF KNEE 3: CPT | Mod: 50

## 2021-04-15 PROCEDURE — 72100 X-RAY EXAM L-S SPINE 2/3 VWS: CPT

## 2021-04-15 NOTE — DISCUSSION/SUMMARY
[de-identified] : I discussed the underlying pathophysiology of the patient's condition in great detail with the patient. I went over the patient's x-rays with them in great detail. The extent of the patient’s arthritis was discussed in great detail with them. We discussed the use of ice, Tylenol and anti-inflammatories to relieve pain. At-home strengthening, and stretching exercises were discussed and demonstrated with the patient. She should focus on light weight and high repetition exercises. At this time, she will start a course of outpatient physical therapy for strengthening and flexibility. A prescription for physical therapy was provided. I stressed the importance of weight loss and its benefits to the patient’s joints and overall health. All of her questions were answered. She understands and consents to the plan. This treatment plan was discussed and reviewed with the patient's daughter who agrees with the treatment course. \par \par FU 1 month.\par after undergoing outpatient PT for her back, hips and knees.\par after focusing on losing weight.

## 2021-04-15 NOTE — REASON FOR VISIT
[Follow-Up Visit] : a follow-up visit for [Knee Pain] : knee pain [Other: _____] : [unfilled] [FreeTextEntry2] : s/p right TKR

## 2021-04-15 NOTE — PHYSICAL EXAM
[de-identified] : Constitutional\par o Appearance : well-nourished, well developed, alert, in no acute distress \par Head and Face\par o Head :\par ¦ Inspection : atraumatic, normocephalic\par o Face :\par ¦ Inspection : no visible rash or discoloration\par Respiratory\par o Respiratory Effort: breathing unlabored \par Neurologic\par o Mental Status Examination :\par ¦ Orientation : alert and oriented X 3\par Psychiatric\par o Mood and Affect: mood normal, affect appropriate\par Cardiovascular\par o Observation/Palpation : - no swelling\par Lymphatic\par o Additional Nodes : No palpable lymph nodes present\par \par Lumbosacral Spine\par o Inspection : no visible rash or discoloration\par o Palpation : left and right SI and paralumbar tenderness\par o Range of Motion : extension causes pain in the left leg and groin that is relieved by forward flexion, sidebending causes discomfort, rotation causes no discomfort\par o Muscle Strength : paraspinal muscle strength and tone within normal limits\par o Muscle Tone : paraspinal muscle strength and tone within normal limits\par o Tests: straight leg test negative\par  \par Right Lower Extremity\par o Buttock : no tenderness, swelling or deformities\par o Right Hip :\par ¦ Inspection/Palpation : no tenderness, swelling or deformities\par ¦ Range of Motion : good flexion but limited rotation, no crepitance\par ¦ Stability : joint stability intact\par ¦ Strength : extension, flexion 3+/5, adduction, abduction, internal rotation and external rotation\par \par o Right Knee :\par ¦ Inspection/Palpation : no tenderness to palpation, no swelling, well healed incisions\par ¦ Range of Motion : 0-115°, no crepitance\par ¦ Stability : no valgus or varus instability present on provocative testing\par ¦ Strength : flexion and extension 3+/5\par ¦ Tests and Signs : negative Anterior Drawer\par \par Left Lower Extremity\par o Buttock : no tenderness, swelling or deformities \par o Left Hip :\par ¦ Inspection/Palpation : no tenderness, swelling or deformities\par ¦ Range of Motion : good flexion but limited rotation, no crepitance\par ¦ Stability : joint stability intact\par ¦ Strength : extension, flexion 4/5, adduction, abduction, internal rotation and external rotation\par \par o Left Knee :\par ¦ Inspection/Palpation : no tenderness to palpation, no swelling, well healed incisions \par ¦ Range of Motion : 0-115°, no crepitance\par ¦ Stability : no valgus or varus instability present on provocative testing\par ¦ Strength : flexion and extension 4/5\par ¦ Tests and Signs : negative Anterior Drawer\par  \par Gait: kyphotic gait, ambulating with walker, poor balance, lower extremity lymphedema, both calves are soft, no foot drop bilaterally, very poor core strength, sensation in tact to light touch bilateral lower extremity\par \par Radiology Results\par o Lumbosacral Spine : AP and lateral views were obtained and reveal a grade 1-2 spondylolisthesis of L4 on L5, severe degenerative disc disease worse at L2/L3 and there is foraminal stenosis as well.\par o Hip and Pelvis: AP pelvis was obtained and reveals severe degenerative arthritis of the left hip, moderate to severe severe arthritis of the right hip.\par o Right Knee : Standing AP, Lateral and skyline views of the knee were obtained and revealed excellent position of her right total knee replacement with central tracking of the patella.\par o Left Knee : Standing AP, Lateral and skyline views of the knee were obtained and revealed excellent position of her left total knee replacement with central tracking of the patella.

## 2021-04-15 NOTE — HISTORY OF PRESENT ILLNESS
[de-identified] : 75 year old female presents complaining of right knee and leg pain. She is s/p bilateral TKR with Dr. Short in [id number]. She notes that she was hospitalized in January with COVID-19 for ~20 days. She was not intubated. She also had a UTI and was treated with antibiotics. She has been staying at her daughter's house since she was discharged, which has stairs. After coming home from the hospital she fell on the stairs and needed stitches in her right leg. Since these incidents, her right leg has felt much weaker than the left. She has been unable to lift her right leg. She was getting home PT, and will be starting outpatient PT soon. She is also complaining of chronic lower back pain. She has radiating pain into the groin and both legs, left worse than right. She is more comfortable sleeping in a recliner. She has a history of lymphedema and does see a vascular doctor.\par \par Radiology Results done on 7/15/2020:\par o Lumbosacral Spine : AP and lateral views were obtained and reveal grade 1 spondylolisthesis of L4/L5 with severe diffuse degenerative disk disease worse at L5/S1 and foraminal stenosis at L4/L5 and L5/S1.\par o Pelvis: AP pelvis and lateral left hip revealed moderate to severe arthritis of both hips, right worse than left.\par o Right Knee : Standing AP, Lateral and skyline views of the knee were obtained and revealed excellent position of the total knee replacement with central tracking of the patella, no signs of loosening. \par o Left Knee : Standing AP, Lateral and skyline views of the knee were obtained and revealed excellent position of the total knee replacement with central tracking of the patella, no signs of loosening. \par \par MRI of the lumbosacral spine done at [facility] on 7/24/2019 revealed: \par 1. T2 hyperintense hepatic lesions may represent cysts versus hemangiomas. Recommend abdominal ultrasound or dedicated abdominal MRI to further evaluate.\par 2. Grade 1-2 anterolisthesis of L4 on L5. Mild dextroscoliosis of the thoracolumbar spine.\par 3. L2-3: Disc bulge, prominent right-sided endplate osteophytes and right greater left facet arthropathy. Mild to moderate spinal canal stenosis and right lateral recess narrowing.\par Moderate right foraminal narrowing and impingement of exiting right L2 nerve root.\par 4. L4-5: Anterolisthesis uncovering disc material. Bilateral facet arthropathy. Mild spinal canal stenosis. Mild left and moderate to severe right foraminal narrowing and compression of exiting right L4 nerve root.\par 5. Degenerative spondylosis at other levels as above.

## 2021-04-15 NOTE — ADDENDUM
[FreeTextEntry1] : I, Nura Bagley, acted solely as a scribe for Dr. Gato Short on this date 04/15/2021.\par All medical record entries made by the Scribe were at my, Dr. Gato Short, direction and personally dictated by me on 04/15/2021. I have reviewed the chart and agree that the record accurately reflects my personal performance of the history, physical exam, assessment and plan. I have also personally directed, reviewed, and agreed with the chart.

## 2021-05-19 ENCOUNTER — APPOINTMENT (OUTPATIENT)
Dept: ULTRASOUND IMAGING | Facility: CLINIC | Age: 76
End: 2021-05-19
Payer: MEDICARE

## 2021-05-19 ENCOUNTER — APPOINTMENT (OUTPATIENT)
Dept: MAMMOGRAPHY | Facility: CLINIC | Age: 76
End: 2021-05-19
Payer: MEDICARE

## 2021-05-19 PROCEDURE — 77067 SCR MAMMO BI INCL CAD: CPT

## 2021-05-19 PROCEDURE — 76641 ULTRASOUND BREAST COMPLETE: CPT | Mod: 50

## 2021-05-19 PROCEDURE — 77063 BREAST TOMOSYNTHESIS BI: CPT

## 2021-11-03 ENCOUNTER — OUTPATIENT (OUTPATIENT)
Dept: OUTPATIENT SERVICES | Facility: HOSPITAL | Age: 76
LOS: 1 days | End: 2021-11-03
Payer: MEDICARE

## 2021-11-03 ENCOUNTER — APPOINTMENT (OUTPATIENT)
Dept: ULTRASOUND IMAGING | Facility: CLINIC | Age: 76
End: 2021-11-03
Payer: MEDICARE

## 2021-11-03 ENCOUNTER — APPOINTMENT (OUTPATIENT)
Dept: MAMMOGRAPHY | Facility: CLINIC | Age: 76
End: 2021-11-03
Payer: MEDICARE

## 2021-11-03 DIAGNOSIS — Z00.8 ENCOUNTER FOR OTHER GENERAL EXAMINATION: ICD-10-CM

## 2021-11-03 PROCEDURE — 76641 ULTRASOUND BREAST COMPLETE: CPT | Mod: 26,RT

## 2021-11-03 PROCEDURE — G0279: CPT | Mod: 26

## 2021-11-03 PROCEDURE — 77065 DX MAMMO INCL CAD UNI: CPT | Mod: 26,RT

## 2021-11-03 PROCEDURE — 76641 ULTRASOUND BREAST COMPLETE: CPT

## 2021-11-03 PROCEDURE — G0279: CPT

## 2021-11-03 PROCEDURE — 77065 DX MAMMO INCL CAD UNI: CPT

## 2021-11-09 ENCOUNTER — APPOINTMENT (OUTPATIENT)
Dept: SURGERY | Facility: CLINIC | Age: 76
End: 2021-11-09
Payer: MEDICARE

## 2021-11-09 VITALS
DIASTOLIC BLOOD PRESSURE: 86 MMHG | HEART RATE: 94 BPM | HEIGHT: 64 IN | BODY MASS INDEX: 42.17 KG/M2 | WEIGHT: 247 LBS | SYSTOLIC BLOOD PRESSURE: 148 MMHG

## 2021-11-09 DIAGNOSIS — C50.911 MALIGNANT NEOPLASM OF UNSPECIFIED SITE OF RIGHT FEMALE BREAST: ICD-10-CM

## 2021-11-09 PROCEDURE — 99204 OFFICE O/P NEW MOD 45 MIN: CPT

## 2021-11-11 NOTE — CONSULT LETTER
[Dear  ___] : Dear  [unfilled], [Consult Letter:] : I had the pleasure of evaluating your patient, [unfilled]. [Please see my note below.] : Please see my note below. [Consult Closing:] : Thank you very much for allowing me to participate in the care of this patient.  If you have any questions, please do not hesitate to contact me. [Sincerely,] : Sincerely, [FreeTextEntry2] : Dr. Patty Villarreal [FreeTextEntry3] : Temi Perez MD, FACS\par Assistant Professor of Surgery and Otolaryngology\par [facility]\par

## 2021-11-11 NOTE — ASSESSMENT
[FreeTextEntry1] : Patient with recent history of right breast redness and swelling which has improved.  No clinical evidence of recurrent breast cancer or inflammatory breast cancer.  I have discussed a breast MRI to better rule out possible small breast recurrence.  I have answered their questions to the best of my ability.  They will contact my office to review results.

## 2021-11-11 NOTE — REASON FOR VISIT
[Consultation] : a consultation visit [Other: _____] : [unfilled] [FreeTextEntry1] : Rule out inflammatory breast cancer

## 2021-11-11 NOTE — HISTORY OF PRESENT ILLNESS
[FreeTextEntry1] : Patient referred by Dr. Villarreal for evaluation of questionable recurrent right breast cancer or inflammatory breast cancer.  Patient has a history of right breast cancer diagnosed in 2013.  Treated with right partial mastectomy and sentinel lymph node excision.  Subsequently treated with anastrozole.  2 weeks ago patient was noted to have redness and increased swelling of her right breast.  Right mammogram and ultrasound November 30, 2021: No suspicious mass, collection or evidence of malignancy.  BI-RADS 2.  Redness and swelling have since decreased.  Patient denies breast trauma, fever, chills, sweats, breast mass, tenderness or nipple discharge.  Patient reports shortness of breath which is currently undergoing extensive work-up.  Cardiac evaluation negative according to the patient's daughter. I have reviewed all old and new data and available images.  Additional information was obtained from others present at the time of the visit to ensure the completeness of the history.\par

## 2021-11-19 ENCOUNTER — APPOINTMENT (OUTPATIENT)
Dept: MRI IMAGING | Facility: CLINIC | Age: 76
End: 2021-11-19
Payer: MEDICARE

## 2021-11-19 ENCOUNTER — RESULT REVIEW (OUTPATIENT)
Age: 76
End: 2021-11-19

## 2021-11-19 PROCEDURE — 77049 MRI BREAST C-+ W/CAD BI: CPT | Mod: MH

## 2021-11-19 PROCEDURE — A9585: CPT

## 2021-11-20 ENCOUNTER — NON-APPOINTMENT (OUTPATIENT)
Age: 76
End: 2021-11-20

## 2022-07-27 ENCOUNTER — APPOINTMENT (OUTPATIENT)
Dept: MAMMOGRAPHY | Facility: CLINIC | Age: 77
End: 2022-07-27

## 2022-07-27 ENCOUNTER — APPOINTMENT (OUTPATIENT)
Dept: ULTRASOUND IMAGING | Facility: CLINIC | Age: 77
End: 2022-07-27

## 2022-07-27 PROCEDURE — 77067 SCR MAMMO BI INCL CAD: CPT

## 2022-07-27 PROCEDURE — 77063 BREAST TOMOSYNTHESIS BI: CPT

## 2022-07-27 PROCEDURE — 76641 ULTRASOUND BREAST COMPLETE: CPT | Mod: 50

## 2022-12-29 NOTE — ED PROVIDER NOTE - NS ED ATTENDING STATEMENT MOD
COVID-19 treatment with the oral antiviral pills or the IV antibody infusion is only recommended for patients who are considered to be high risk of having a severe complication due to a COVID-19 infection. This risk is calculated using a patient's other risk factors (i.e. age,sex,race,presence of certain chronic medical conditions). Patient's current COVID-19 risk score is 1, placing her at low risk. Treatment for patients who are considered to be low risk is based on symptoms management, such as OTC cough/cold/congestion medications and tylenol/ibuprofen for fever or body aches.    Current COVID-19 quarantine recommendations are to isolate for a total of 5 days from the start of symptoms. You can exit quarantine after the 5 days if your symptoms are improving and you are fever free without the use of medications for 24 hours.    Please let me know if patient has any additional questions or concerns.     I have personally performed a face to face diagnostic evaluation on this patient. I have reviewed the ACP note and agree with the history, exam and plan of care, except as noted.

## 2023-03-07 NOTE — PROGRESS NOTE ADULT - PROBLEM SELECTOR PROBLEM 2
Essential hypertension
Malignant neoplasm of right female breast, unspecified estrogen receptor status, unspecified site of breast
Malignant neoplasm of right female breast, unspecified estrogen receptor status, unspecified site of breast
Negative

## 2023-08-21 ENCOUNTER — APPOINTMENT (OUTPATIENT)
Dept: ULTRASOUND IMAGING | Facility: CLINIC | Age: 78
End: 2023-08-21

## 2023-08-21 ENCOUNTER — APPOINTMENT (OUTPATIENT)
Dept: MAMMOGRAPHY | Facility: CLINIC | Age: 78
End: 2023-08-21
Payer: MEDICARE

## 2023-08-21 PROCEDURE — 77063 BREAST TOMOSYNTHESIS BI: CPT

## 2023-08-21 PROCEDURE — 77067 SCR MAMMO BI INCL CAD: CPT

## 2023-10-23 NOTE — H&P ADULT - PROBLEM SELECTOR PLAN 4
-UA negative for hematuria, non-suggestive of infection  -CT renal stone hunt with no uropathic abnormalities save for BL renal cysts. will order renal sono for now No.

## 2024-08-08 ENCOUNTER — APPOINTMENT (OUTPATIENT)
Dept: MAMMOGRAPHY | Facility: CLINIC | Age: 79
End: 2024-08-08

## 2024-08-08 PROCEDURE — 77063 BREAST TOMOSYNTHESIS BI: CPT

## 2024-08-08 PROCEDURE — 77067 SCR MAMMO BI INCL CAD: CPT

## 2024-08-22 ENCOUNTER — TRANSCRIPTION ENCOUNTER (OUTPATIENT)
Age: 79
End: 2024-08-22

## 2024-09-03 NOTE — PHYSICAL EXAM
[Normocephalic] : normocephalic [EOMI] : extra ocular movement intact [Sclera nonicteric] : sclera nonicteric [No Supraclavicular Adenopathy] : no supraclavicular adenopathy [Supple] : supple [No Cervical Adenopathy] : no cervical adenopathy [Examined in the supine and seated position] : examined in the supine and seated position [No Thyromegaly] : no thyromegaly [No dominant masses] : no dominant masses in right breast  [No dominant masses] : no dominant masses left breast [No Nipple Retraction] : no left nipple retraction [No Nipple Discharge] : no left nipple discharge [No Axillary Lymphadenopathy] : no left axillary lymphadenopathy [Soft] : abdomen soft [No Swelling] : no swelling [Full ROM] : full range of motion [No Rashes] : no rashes [de-identified] : minimal skin coloration change without warmth or evidence of  peau d'orange [de-identified] : pendulous 03-Sep-2024 23:31